# Patient Record
Sex: MALE | Race: WHITE | URBAN - METROPOLITAN AREA
[De-identification: names, ages, dates, MRNs, and addresses within clinical notes are randomized per-mention and may not be internally consistent; named-entity substitution may affect disease eponyms.]

---

## 2017-08-11 DIAGNOSIS — N40.0 BPH (BENIGN PROSTATIC HYPERPLASIA): Primary | ICD-10-CM

## 2017-08-11 RX ORDER — TAMSULOSIN HYDROCHLORIDE 0.4 MG/1
0.8 CAPSULE ORAL DAILY
Qty: 180 CAPSULE | Refills: 0 | Status: SHIPPED | OUTPATIENT
Start: 2017-08-11 | End: 2017-11-27

## 2017-08-11 NOTE — TELEPHONE ENCOUNTER
Signed Prescriptions:                        Disp   Refills    tamsulosin (FLOMAX) 0.4 MG capsule         180 ca*0        Sig: Take 2 capsules (0.8 mg) by mouth daily Patient needs           appointment for further refills.  Authorizing Provider: ANDRY JIMENEZ  Ordering User: RADHA BRICE    Prescription approved per [facility] Refill Protocol.  Radha Brice RN

## 2017-11-27 ENCOUNTER — TELEPHONE (OUTPATIENT)
Dept: FAMILY MEDICINE | Facility: CLINIC | Age: 72
End: 2017-11-27

## 2017-11-27 DIAGNOSIS — N40.0 BPH (BENIGN PROSTATIC HYPERPLASIA): ICD-10-CM

## 2017-11-27 RX ORDER — TAMSULOSIN HYDROCHLORIDE 0.4 MG/1
0.8 CAPSULE ORAL DAILY
Qty: 60 CAPSULE | Refills: 0 | Status: SHIPPED | OUTPATIENT
Start: 2017-11-27 | End: 2017-12-06

## 2017-11-27 NOTE — TELEPHONE ENCOUNTER
Reason for call: med refill   Patient called regarding (reason for call): prescription-tamsulosin 0.4mg-hcl  Additional comments:Judy is out now and is asking for a refill until his appointment to be filled at [facility] at [phone]      Phone number to reach patient: [phone]  Best Time: anytime    Can we leave a detailed message on this number?  YES

## 2017-11-27 NOTE — TELEPHONE ENCOUNTER
Signed Prescriptions:                        Disp   Refills    tamsulosin (FLOMAX) 0.4 MG capsule         60 cap*0        Sig: Take 2 capsules (0.8 mg) by mouth daily Patient needs           appointment for further refills.  Authorizing Provider: ANDRY JIMENEZ  Ordering User: RADHA BRICE

## 2017-12-06 ENCOUNTER — OFFICE VISIT (OUTPATIENT)
Dept: UROLOGY | Facility: OTHER | Age: 72
End: 2017-12-06
Payer: COMMERCIAL

## 2017-12-06 VITALS
BODY MASS INDEX: 32.68 KG/M2 | SYSTOLIC BLOOD PRESSURE: 130 MMHG | DIASTOLIC BLOOD PRESSURE: 68 MMHG | HEIGHT: 72 IN | WEIGHT: 241.25 LBS

## 2017-12-06 DIAGNOSIS — N40.1 BENIGN PROSTATIC HYPERPLASIA WITH LOWER URINARY TRACT SYMPTOMS, SYMPTOM DETAILS UNSPECIFIED: ICD-10-CM

## 2017-12-06 PROCEDURE — 51798 US URINE CAPACITY MEASURE: CPT | Performed by: UROLOGY

## 2017-12-06 PROCEDURE — 99213 OFFICE O/P EST LOW 20 MIN: CPT | Mod: 25 | Performed by: UROLOGY

## 2017-12-06 RX ORDER — SIMVASTATIN 5 MG
2.5 TABLET ORAL
COMMUNITY
Start: 2017-04-24

## 2017-12-06 RX ORDER — TAMSULOSIN HYDROCHLORIDE 0.4 MG/1
0.8 CAPSULE ORAL DAILY
Qty: 60 CAPSULE | Refills: 0 | Status: SHIPPED | OUTPATIENT
Start: 2017-12-06 | End: 2018-02-01

## 2017-12-06 NOTE — PROGRESS NOTES
Chief Complaint   Patient presents with     RECHECK     prostate       Moisés Sinclair is a 72 year old male who presents today for follow up of   Chief Complaint   Patient presents with     RECHECK     prostate    f/u benign prostatic hyperplasia.  He is on flomax.  He still has some urinary symptoms.  His AUA is 18 with QOL of 4.  Patient is interested in urolift.    Current Outpatient Prescriptions   Medication Sig Dispense Refill     ranitidine (ZANTAC) 150 MG tablet Take 150 mg by mouth       simvastatin (ZOCOR) 5 MG tablet Take 2.5 mg by mouth       tamsulosin (FLOMAX) 0.4 MG capsule Take 2 capsules (0.8 mg) by mouth daily Patient needs appointment for further refills. 60 capsule 0     DULoxetine (CYMBALTA) 30 MG capsule Take 30 mg by mouth 2 times daily.       NEW MED 1 tablet 2 times daily. Unknown OTC drug for acid reflux       acetaminophen (TYLENOL) 500 MG tablet Take 1,000 mg by mouth every 6 hours as needed.       tadalafil (CIALIS) 20 MG tablet Take 1 tablet (20 mg) by mouth daily as needed for erectile dysfunction (Patient not taking: Reported on 12/6/2017) 30 tablet 4     No Known Allergies   Past Medical History:   Diagnosis Date     Anxiety      Bipolar disorder (H)      Chronic pain      Closed fracture of navicular (scaphoid) bone of wrist     Wrist fracture left 3 pins     Enthesopathy of hip region 01/02/2008    bilat     Esophageal reflux      Mixed hyperlipidemia      Smoker      Unspecified essential hypertension      Unspecified hereditary and idiopathic peripheral neuropathy [id number]     Past Surgical History:   Procedure Laterality Date     FRACTURE TX, WRIST RT/LT  2000    Left wrist     ROTATOR CUFF REPAIR RT/LT      right shoulder     VASECTOMY       Family History   Problem Relation Age of Onset     CEREBROVASCULAR DISEASE Father      HEART DISEASE Father      TIA triple by pass     CANCER Brother      Psychotic Disorder Brother      HEART DISEASE Sister      heart disease      Psychotic Disorder Sister      bi polar     Social History     Social History     Marital status:      Spouse name: N/A     Number of children: N/A     Years of education: N/A     Social History Main Topics     Smoking status: Current Every Day Smoker     Packs/day: 1.00     Types: Cigarettes     Smokeless tobacco: Never Used     Alcohol use Yes      Comment: rare     Drug use: No     Sexual activity: Not Currently     Other Topics Concern     Not on file     Social History Narrative       REVIEW OF SYSTEMS  =================  C: NEGATIVE for fever, chills, change in weight  I: NEGATIVE for worrisome rashes, moles or lesions  E/M: NEGATIVE for ear, mouth and throat problems  R: NEGATIVE for significant cough or SHORTNESS OF BREATH,   CV: NEGATIVE for chest pain, palpitations or peripheral edema  GI: NEGATIVE for nausea, abdominal pain, heartburn, or change in bowel habits  NEURO: NEGATIVE any motor/sensory changes  PSYCH: NEGATIVE for recent mood disorder    Physical Exam:  /68 (BP Location: Right arm, Patient Position: Chair, Cuff Size: Adult Regular)  Ht 6' (1.829 m)  Wt 241 lb 4 oz (109.4 kg)  BMI 32.72 kg/m2   Patient is pleasant, in no acute distress, good general condition.  Lung: no evidence of respiratory distress    Abdomen: Soft, nondistended, non tender. No masses. No rebound or guarding.   Exam: bladder scan 67 ml.  No cva tenderness  Skin: Warm and dry.  No redness.  Psych: normal mood and affect  Neuro: alert and oriented    Assessment/Plan:   (N40.1) Benign prostatic hyperplasia with lower urinary tract symptoms, symptom details unspecified  Comment:    Plan: MEASURE POST-VOID RESIDUAL URINE/BLADDER         CAPACITY, US NON-IMAGING, tamsulosin (FLOMAX)         0.4 MG capsule        Schedule for urolift next year

## 2017-12-06 NOTE — NURSING NOTE
Chief Complaint   Patient presents with     RECHECK     prostate       Initial /68 (BP Location: Right arm, Patient Position: Chair, Cuff Size: Adult Regular)  Ht 6' (1.829 m)  Wt 241 lb 4 oz (109.4 kg)  BMI 32.72 kg/m2 Estimated body mass index is 32.72 kg/(m^2) as calculated from the following:    Height as of this encounter: 6' (1.829 m).    Weight as of this encounter: 241 lb 4 oz (109.4 kg).  Medication Reconciliation: complete     Anjelica Quintanilla, CMA

## 2017-12-06 NOTE — MR AVS SNAPSHOT
"              After Visit Summary   2017    Moisés Sinclair    MRN: [id number]           Patient Information     Date Of Birth          1945        Visit Information        Provider Department      2017 10:15 AM Shahzad Vilallba MD [facility]        Today's Diagnoses     Benign prostatic hyperplasia with lower urinary tract symptoms, symptom details unspecified           Follow-ups after your visit        Who to contact     If you have questions or need follow up information about today's clinic visit or your schedule please contact [facility] directly at [phone].  Normal or non-critical lab and imaging results will be communicated to you by Polwirehart, letter or phone within 4 business days after the clinic has received the results. If you do not hear from us within 7 days, please contact the clinic through Polwirehart or phone. If you have a critical or abnormal lab result, we will notify you by phone as soon as possible.  Submit refill requests through SofGenie or call your pharmacy and they will forward the refill request to us. Please allow 3 business days for your refill to be completed.          Additional Information About Your Visit        MyChart Information     SofGenie lets you send messages to your doctor, view your test results, renew your prescriptions, schedule appointments and more. To sign up, go to [email or web address] . Click on \"Log in\" on the left side of the screen, which will take you to the Welcome page. Then click on \"Sign up Now\" on the right side of the page.     You will be asked to enter the access code listed below, as well as some personal information. Please follow the directions to create your username and password.     Your access code is: [email or web address]  Expires: 3/6/2018 10:36 AM     Your access code will  in 90 days. If you need help or a new code, please call your [facility] or [phone].        Care EveryWhere ID  "    This is your Care EveryWhere ID. This could be used by other organizations to access your [place] medical records  [id number]        Your Vitals Were     Height BMI (Body Mass Index)                6' (1.829 m) 32.72 kg/m2           Blood Pressure from Last 3 Encounters:   12/06/17 130/68   08/03/16 152/74   06/09/16 140/74    Weight from Last 3 Encounters:   12/06/17 241 lb 4 oz (109.4 kg)   08/03/16 228 lb (103.4 kg)   06/09/16 228 lb (103.4 kg)              We Performed the Following     MEASURE POST-VOID RESIDUAL URINE/BLADDER CAPACITY, US NON-IMAGING          Where to get your medicines      These medications were sent to [facility] - [place], [place] - [address]  [address] [place] [address] [address]     Phone:  [phone]     tamsulosin 0.4 MG capsule          Primary Care Provider Office Phone # Fax #    Bryant Sabillon [phone] [address] [facility] [address]        Equal Access to Services     [facility]: Hadii aad ku hadasho Soomaali, waaxda luqadaha, qaybta kaalmada adeegyada, nigel sheikh . So [facility] [phone].    ATENCIÓN: Si habla español, tiene a liu disposición servicios gratuitos de asistencia lingüística. LlThe [facility] [phone].    We comply with applicable federal civil rights laws and [place] laws. We do not discriminate on the basis of race, color, national origin, age, disability, sex, sexual orientation, or gender identity.            Thank you!     Thank you for choosing [facility]  for your care. Our goal is always to provide you with excellent care. Hearing back from our patients is one way we can continue to improve our services. Please take a few minutes to complete the written survey that you may receive in the mail after your visit with us. Thank you!             Your Updated Medication List - Protect others around you: Learn how to safely use, store and throw away your medicines at  www.disposemymeds.org.          This list is accurate as of: 12/6/17 10:36 AM.  Always use your most recent med list.                   Brand Name Dispense Instructions for use Diagnosis    CYMBALTA 30 MG EC capsule   Generic drug:  DULoxetine      Take 30 mg by mouth 2 times daily.        NEW MED      1 tablet 2 times daily. Unknown OTC drug for acid reflux        ranitidine 150 MG tablet    ZANTAC     Take 150 mg by mouth        simvastatin 5 MG tablet    ZOCOR     Take 2.5 mg by mouth        tadalafil 20 MG tablet    CIALIS    30 tablet    Take 1 tablet (20 mg) by mouth daily as needed for erectile dysfunction    Impotence of organic origin       tamsulosin 0.4 MG capsule    FLOMAX    60 capsule    Take 2 capsules (0.8 mg) by mouth daily Patient needs appointment for further refills.    Benign prostatic hyperplasia with lower urinary tract symptoms, symptom details unspecified       TYLENOL 500 MG tablet   Generic drug:  acetaminophen      Take 1,000 mg by mouth every 6 hours as needed.

## 2017-12-06 NOTE — PROGRESS NOTES
Bladder Scan performed. First time 353 mL and after voiding 67mL maximum residual urine detected after 3 scans. MD informed.     Anjelica Quintanilla, CMA

## 2017-12-07 ENCOUNTER — TELEPHONE (OUTPATIENT)
Dept: UROLOGY | Facility: CLINIC | Age: 72
End: 2017-12-07

## 2017-12-07 NOTE — TELEPHONE ENCOUNTER
Patient needs urolift in January. Left message for patient to call to discuss possible dates. 1/11/2018? Or possibly 1/4/2018 if Hospital can add it on late afternoon?   Lisy España, CMA

## 2017-12-07 NOTE — TELEPHONE ENCOUNTER
Reason for call:  Other   Patient called regarding (reason for call): call back  Additional comments: Patient returned call/please call again.      Phone number to reach patient:  Home number on file [phone] (home)    Best Time:  ASAP    Can we leave a detailed message on this number?  YES

## 2017-12-08 ENCOUNTER — TELEPHONE (OUTPATIENT)
Dept: UROLOGY | Facility: CLINIC | Age: 72
End: 2017-12-08

## 2017-12-08 NOTE — TELEPHONE ENCOUNTER
Surgery scheduled at [facility] on 3/8/2018 at 12:30pm, arrive 11:00am. Surgical packet mailed to patient home address. precert sent to [facility] care. Form faxed to [facility].  Lisy España CMA

## 2017-12-08 NOTE — LETTER
[address] [address]  [phone]          December 8, 2017    Moisés Sinclair                                                                                                                     [address] [facility] [address]            Dear Moisés,        Enclosed is information about your upcomming surgery. Please call our office if you have questions, [phone].    Sincerely,     [facility]

## 2017-12-08 NOTE — TELEPHONE ENCOUNTER
Reason for Call:  Other returning call    Detailed comments: Patient returned a call. He states home number is still the best number to reach him. Thank you.     Phone Number Patient can be reached at: Home number on file [phone] (home)    Best Time: any    Can we leave a detailed message on this number? YES    Call taken on 12/8/2017 at 9:04 AM by Jovanni Ann

## 2017-12-08 NOTE — TELEPHONE ENCOUNTER
Patient prefers [facility] in early March. I will schedule and mail patient the info packet.   Lisy España, CMA

## 2018-02-01 ENCOUNTER — TELEPHONE (OUTPATIENT)
Dept: FAMILY MEDICINE | Facility: CLINIC | Age: 73
End: 2018-02-01

## 2018-02-01 DIAGNOSIS — N40.1 BENIGN PROSTATIC HYPERPLASIA WITH LOWER URINARY TRACT SYMPTOMS, SYMPTOM DETAILS UNSPECIFIED: ICD-10-CM

## 2018-02-01 RX ORDER — TAMSULOSIN HYDROCHLORIDE 0.4 MG/1
0.8 CAPSULE ORAL DAILY
Qty: 180 CAPSULE | Refills: 3 | Status: SHIPPED | OUTPATIENT
Start: 2018-02-01 | End: 2019-02-01

## 2018-02-01 NOTE — TELEPHONE ENCOUNTER
Reason for call: med refill  Patient called regarding (reason for call): prescription-flomax  Additional comments: Patient has been out over a week and pharmacy has faxed twice for it    Phone number to reach patient:  [phone]  Best Time:  anytime    Can we leave a detailed message on this number?  YES

## 2018-02-22 ENCOUNTER — TELEPHONE (OUTPATIENT)
Dept: UROLOGY | Facility: CLINIC | Age: 73
End: 2018-02-22

## 2018-02-22 NOTE — TELEPHONE ENCOUNTER
Reason for Call:  Other call back    Detailed comments: Patient is scheduled for surgery on 03/08/2017 in [place], patient is asking if he is going to need to have a preop done before surgery.Please call and advise Thankyou    Phone Number Patient can be reached at: Home number on file [phone] (home)    Best Time: any    Can we leave a detailed message on this number? YES    Call taken on 2/22/2018 at 11:19 AM by Gayatri Bird

## 2018-02-22 NOTE — TELEPHONE ENCOUNTER
RN called and spoke with patient regarding need for Pre-op. RN provided patient with [facility] number to call and schedule pre-op. Patient thanked RN and we ended call.    CATRACHITA Calvillo

## 2018-02-28 ENCOUNTER — OFFICE VISIT (OUTPATIENT)
Dept: FAMILY MEDICINE | Facility: CLINIC | Age: 73
End: 2018-02-28
Payer: COMMERCIAL

## 2018-02-28 VITALS
SYSTOLIC BLOOD PRESSURE: 120 MMHG | HEART RATE: 80 BPM | TEMPERATURE: 97.8 F | WEIGHT: 243.8 LBS | DIASTOLIC BLOOD PRESSURE: 68 MMHG | OXYGEN SATURATION: 96 % | BODY MASS INDEX: 33.02 KG/M2 | HEIGHT: 72 IN

## 2018-02-28 DIAGNOSIS — N40.1 BENIGN PROSTATIC HYPERPLASIA WITH WEAK URINARY STREAM: ICD-10-CM

## 2018-02-28 DIAGNOSIS — Z01.818 PREOP GENERAL PHYSICAL EXAM: Primary | ICD-10-CM

## 2018-02-28 DIAGNOSIS — R39.12 BENIGN PROSTATIC HYPERPLASIA WITH WEAK URINARY STREAM: ICD-10-CM

## 2018-02-28 LAB
ERYTHROCYTE [DISTWIDTH] IN BLOOD BY AUTOMATED COUNT: 13.4 % (ref 10–15)
HCT VFR BLD AUTO: 41.1 % (ref 40–53)
HGB BLD-MCNC: 13.1 G/DL (ref 13.3–17.7)
MCH RBC QN AUTO: 31.7 PG (ref 26.5–33)
MCHC RBC AUTO-ENTMCNC: 31.9 G/DL (ref 31.5–36.5)
MCV RBC AUTO: 100 FL (ref 78–100)
PLATELET # BLD AUTO: 268 10E9/L (ref 150–450)
RBC # BLD AUTO: 4.13 10E12/L (ref 4.4–5.9)
WBC # BLD AUTO: 14 10E9/L (ref 4–11)

## 2018-02-28 PROCEDURE — 93000 ELECTROCARDIOGRAM COMPLETE: CPT | Performed by: NURSE PRACTITIONER

## 2018-02-28 PROCEDURE — 85027 COMPLETE CBC AUTOMATED: CPT | Performed by: NURSE PRACTITIONER

## 2018-02-28 PROCEDURE — 36415 COLL VENOUS BLD VENIPUNCTURE: CPT | Performed by: NURSE PRACTITIONER

## 2018-02-28 PROCEDURE — 99214 OFFICE O/P EST MOD 30 MIN: CPT | Performed by: NURSE PRACTITIONER

## 2018-02-28 NOTE — MR AVS SNAPSHOT
After Visit Summary   2/28/2018    Moisés Sinclair    MRN: [id number]           Patient Information     Date Of Birth          1945        Visit Information        Provider Department      2/28/2018 10:00 AM Olena Saldivar APRN [facility]        Today's Diagnoses     Preop general physical exam    -  1    Benign prostatic hyperplasia with weak urinary stream          Care Instructions      Before Your Surgery      Call your surgeon if there is any change in your health. This includes signs of a cold or flu (such as a sore throat, runny nose, cough, rash or fever).    Do not smoke, drink alcohol or take over the counter medicine (unless your surgeon or primary care doctor tells you to) for the 24 hours before and after surgery.    If you take prescribed drugs: Follow your doctor s orders about which medicines to take and which to stop until after surgery.    Eating and drinking prior to surgery: follow the instructions from your surgeon    Take a shower or bath the night before surgery. Use the soap your surgeon gave you to gently clean your skin. If you do not have soap from your surgeon, use your regular soap. Do not shave or scrub the surgery site.  Wear clean pajamas and have clean sheets on your bed.           Follow-ups after your visit        Your next 10 appointments already scheduled     Mar 06, 2018  9:15 AM CST   [facility] MEDICARE AAA SCREENING with PHUS1   [facility]trasound ([facility])    [address] [place] [id number] [phone]           Please bring a list of your medicines (including vitamins, minerals and over-the-counter drugs). Also, tell your doctor about any allergies you may have. Wear comfortable clothes and leave your valuables at home.  Adults: No eating or drinking for 8 hours before the exam. You may take medicine with a small sip of water.  Children: - Children 6+ years: No food or drink for 6  "hours before exam. - Children 1-5 years: No food or drink for 4 hours before exam. - Infants, breast-fed: may have breast milk up to 2 hours before exam. - Infants, formula: may have bottle until 4 hours before exam.  Please call the Imaging Department at your exam site with any questions.            Mar 08, 2018   Procedure with Shahzad Villalba MD   [facility] ([facility])    [address] [place] [address] [place] [id number] [phone]           From [address]: Exit at [address] on south side of [place]. Turn right on [address]. Turn left at stoplight on [address]. [facility] will be in view two blocks ahead              Future tests that were ordered for you today     Open Future Orders        Priority Expected Expires Ordered    [place] [employer] Medicare AAA Screening Routine  2/27/2019 2/27/2018            Who to contact     If you have questions or need follow up information about today's clinic visit or your schedule please contact [facility] directly at [phone].  Normal or non-critical lab and imaging results will be communicated to you by MyChart, letter or phone within 4 business days after the clinic has received the results. If you do not hear from us within 7 days, please contact the clinic through Virtustreamt or phone. If you have a critical or abnormal lab result, we will notify you by phone as soon as possible.  Submit refill requests through POWWOW or call your pharmacy and they will forward the refill request to us. Please allow 3 business days for your refill to be completed.          Additional Information About Your Visit        [employer] Information     POWWOW lets you send messages to your doctor, view your test results, renew your prescriptions, schedule appointments and more. To sign up, go to [email or web address] . Click on \"Log in\" on the left side of the screen, which will take you to the Welcome page. Then " "click on \"Sign up Now\" on the right side of the page.     You will be asked to enter the access code listed below, as well as some personal information. Please follow the directions to create your username and password.     Your access code is: [email or web address]  Expires: 3/6/2018 10:36 AM     Your access code will  in 90 days. If you need help or a new code, please call your [place] clinic or [phone].        Care EveryWhere ID     This is your Care EveryWhere ID. This could be used by other organizations to access your [place] medical records  [id number]        Your Vitals Were     Pulse Temperature Height Pulse Oximetry BMI (Body Mass Index)       80 97.8  F (36.6  C) (Temporal) 6' (1.829 m) 96% 33.07 kg/m2        Blood Pressure from Last 3 Encounters:   18 120/68   17 130/68   16 152/74    Weight from Last 3 Encounters:   18 243 lb 12.8 oz (110.6 kg)   17 241 lb 4 oz (109.4 kg)   16 228 lb (103.4 kg)              We Performed the Following     CBC with platelets     EKG 12-lead complete w/read - Clinics        Primary Care Provider Office Phone # Fax #    Bryant Sabillon [phone] [address] [facility] [address]        Equal Access to Services     [facility]: Hadii aad ku hadasho Soomaali, waaxda luqadaha, qaybta kaalmada adeegyada, nigel sheikh . So [facility] [phone].    ATENCIÓN: Si habla español, tiene a liu disposición servicios gratuitos de asistencia lingüística. Llame al [phone].    We comply with applicable federal civil rights laws and [place] laws. We do not discriminate on the basis of race, color, national origin, age, disability, sex, sexual orientation, or gender identity.            Thank you!     Thank you for choosing [facility]  for your care. Our goal is always to provide you with excellent care. Hearing back from our patients is one way we can continue to " improve our services. Please take a few minutes to complete the written survey that you may receive in the mail after your visit with us. Thank you!             Your Updated Medication List - Protect others around you: Learn how to safely use, store and throw away your medicines at www.disposemymeds.org.          This list is accurate as of 2/28/18  1:41 PM.  Always use your most recent med list.                   Brand Name Dispense Instructions for use Diagnosis    CYMBALTA 30 MG EC capsule   Generic drug:  DULoxetine      Take 30 mg by mouth 2 times daily.        ranitidine 150 MG tablet    ZANTAC     Take 150 mg by mouth        simvastatin 5 MG tablet    ZOCOR     Take 2.5 mg by mouth        tamsulosin 0.4 MG capsule    FLOMAX    180 capsule    Take 2 capsules (0.8 mg) by mouth daily Patient needs appointment for further refills.    Benign prostatic hyperplasia with lower urinary tract symptoms, symptom details unspecified       TYLENOL 500 MG tablet   Generic drug:  acetaminophen      Take 1,000 mg by mouth every 6 hours as needed.

## 2018-02-28 NOTE — PROGRESS NOTES
[address] [address]  [phone]  Dept: [phone]    PRE-OP EVALUATION:  Today's date: 2018    Moisés Sinclair (: 1945) presents for pre-operative evaluation assessment as requested by Dr. Villalba.  He requires evaluation and anesthesia risk assessment prior to undergoing surgery/procedure for treatment of urinary hesitancy and incomplete emptying secondary to BPH  Fax number for surgical facility:   Primary Physician: Marco Antonio Bermudez MD/  Misbah LANCASTER  Type of Anesthesia Anticipated: General    Patient has a Health Care Directive or Living Will:  NO    Preop Questions 2018   Who is doing your surgery? Dr. Shahzad Villalba   What are you having done? Prostate Surgery   Date of Surgery/Procedure: 2018   Facility or Hospital where procedure/surgery will be performed: [facility]   1.  Do you have a history of Heart attack, stroke, stent, coronary bypass surgery, or other heart surgery? No   2.  Do you ever have any pain or discomfort in your chest? No   3.  Do you have a history of  Heart Failure? No   4.   Are you troubled by shortness of breath when:  walking on a level surface, or up a slight hill, or at night? No   5.  Do you currently have a cold, bronchitis or other respiratory infection? No   6.  Do you have a cough, shortness of breath, or wheezing? No   7.  Do you sometimes get pains in the calves of your legs when you walk? YES - due to neuropathy   8. Do you or anyone in your family have previous history of blood clots? No   9.  Do you or does anyone in your family have a serious bleeding problem such as prolonged bleeding following surgeries or cuts? No   10. Have you ever had problems with anemia or been told to take iron pills? YES - [facility], has since stopped for a couple days   11. Have you had any abnormal blood loss such as black, tarry or bloody stools? UNKNOWN - ? Due to iron  pills   12. Have you ever had a blood transfusion? No   13. Have you or any of your relatives ever had problems with anesthesia? No   14. Do you have sleep apnea, excessive snoring or daytime drowsiness? YES - snores   15. Do you have any prosthetic heart valves? No   16. Do you have prosthetic joints? No     No personal or family history of anesthesia reactions or malignant hyperthermia  No personal or family history of blood clots or bleeding disorders  No recent use of steroids    HPI:     HPI related to upcoming procedure: Hx BPH.  Problems with urination worsening  See problem list for active medical problems.  Problems all longstanding and stable, except as noted/documented.  See ROS for pertinent symptoms related to these conditions.                Followed by Misbah LANCASTER. Last saw Dr. Bermudez in January                                                                                  .    MEDICAL HISTORY:     Patient Active Problem List    Diagnosis Date Noted     Leukocytosis 07/01/2014     Priority: Medium     Angular cheilitis 07/01/2014     Priority: Medium     Hyperlipidemia with target LDL less than 130 06/06/2014     Priority: Medium     Diagnosis updated by automated process. Provider to review and confirm.       BPH (benign prostatic hyperplasia) 06/06/2014     Priority: Medium     Obesity 06/06/2014     Priority: Medium     Advanced directives, counseling/discussion 08/13/2013     Priority: Medium     Form given   Maria Ines Ladd MA         Lumbar discogenic pain syndrome 08/13/2013     Priority: Medium     Bipolar disorder (H)      Priority: Medium     Anxiety      Priority: Medium     Smoker      Priority: Medium     Hereditary and idiopathic peripheral neuropathy      Priority: Medium     Problem list name updated by automated process. Provider to review       Esophageal reflux      Priority: Medium     Mixed hyperlipidemia      Priority: Medium     DJD (degenerative joint disease) 04/18/2013      Priority: Medium     Idiopathic peripheral neuropathy 04/18/2013     Priority: Medium     Encounter for long-term use of opiate analgesic 04/18/2013     Priority: Medium     Acute reaction to stress 04/18/2013     Priority: Medium     Problem list name updated by automated process. Provider to review        Past Medical History:   Diagnosis Date     Anxiety      Bipolar disorder (H)      Chronic pain      Closed fracture of navicular (scaphoid) bone of wrist     Wrist fracture left 3 pins     Enthesopathy of hip region 01/02/2008    bilat     Esophageal reflux      Mixed hyperlipidemia      Smoker      Unspecified essential hypertension      Unspecified hereditary and idiopathic peripheral neuropathy [id number]     Past Surgical History:   Procedure Laterality Date     FRACTURE TX, WRIST RT/LT  2000    Left wrist     ROTATOR CUFF REPAIR RT/LT      right shoulder     VASECTOMY       Current Outpatient Prescriptions   Medication Sig Dispense Refill     tamsulosin (FLOMAX) 0.4 MG capsule Take 2 capsules (0.8 mg) by mouth daily Patient needs appointment for further refills. 180 capsule 3     ranitidine (ZANTAC) 150 MG tablet Take 150 mg by mouth       simvastatin (ZOCOR) 5 MG tablet Take 2.5 mg by mouth       DULoxetine (CYMBALTA) 30 MG capsule Take 30 mg by mouth 2 times daily.       acetaminophen (TYLENOL) 500 MG tablet Take 1,000 mg by mouth every 6 hours as needed.       OTC products: None, except as noted above    No Known Allergies   Latex Allergy: NO    Social History   Substance Use Topics     Smoking status: Current Every Day Smoker     Packs/day: 1.00     Types: Cigarettes     Smokeless tobacco: Never Used     Alcohol use Yes      Comment: rare     History   Drug Use No       REVIEW OF SYSTEMS:   CONSTITUTIONAL: NEGATIVE for fever, chills, change in weight  INTEGUMENTARY/SKIN: NEGATIVE for worrisome rashes, moles or lesions  EYES: NEGATIVE for vision changes or irritation  ENT/MOUTH: NEGATIVE for ear, mouth and  throat problems  RESP: NEGATIVE for significant cough or SOB  CV: NEGATIVE for chest pain, palpitations or peripheral edema  GI: NEGATIVE for nausea, abdominal pain, heartburn, or change in bowel habits   male :positive for, decreased urinary stream and hesitancy  MUSCULOSKELETAL: NEGATIVE for significant arthralgias or myalgia  NEURO: NEGATIVE for weakness, dizziness or paresthesias  ENDOCRINE: NEGATIVE for temperature intolerance, skin/hair changes  HEME: NEGATIVE for bleeding problems  PSYCHIATRIC: NEGATIVE for changes in mood or affect    EXAM:   /68  Pulse 80  Temp 97.8  F (36.6  C) (Temporal)  Ht 6' (1.829 m)  Wt 243 lb 12.8 oz (110.6 kg)  SpO2 96%  BMI 33.07 kg/m2    GENERAL APPEARANCE: Very pleasant overweight male in no acute distress.  Hydration status appears adequate     EYES: EOMI,  PERRL     HENT: ear canals and TM's normal and nose and mouth without ulcers or lesions     NECK: no adenopathy, no asymmetry, masses, or scars and thyroid normal to palpation     RESP: lungs clear to auscultation - no rales, rhonchi or wheezes     CV: regular rates and rhythm, normal S1 S2, no S3 or S4 and no murmur, click or rub     ABDOMEN:  soft, nontender, no HSM or masses and bowel sounds normal     MS: extremities normal- no gross deformities noted, no evidence of inflammation in joints, FROM in all extremities.     SKIN: no suspicious lesions or rashes     NEURO: Normal strength and tone, sensory exam grossly normal, mentation intact and speech normal     PSYCH: mentation appears normal. and affect normal/bright     LYMPHATICS: No cervical adenopathy    DIAGNOSTICS:     EKG: appears normal, NSR, normal axis, normal intervals, no acute ST/T changes c/w ischemia, no LVH by voltage criteria  Labs Resulted Today:   Results for orders placed or performed in visit on 02/28/18   CBC with platelets   Result Value Ref Range    WBC 14.0 (H) 4.0 - 11.0 10e9/L    RBC Count 4.13 (L) 4.4 - 5.9 10e12/L    Hemoglobin  13.1 (L) 13.3 - 17.7 g/dL    Hematocrit 41.1 40.0 - 53.0 %     78 - 100 fl    MCH 31.7 26.5 - 33.0 pg    MCHC 31.9 31.5 - 36.5 g/dL    RDW 13.4 10.0 - 15.0 %    Platelet Count 268 150 - 450 10e9/L       Recent Labs   Lab Test  07/01/14   1308  06/06/14   1132   HGB  13.1*  13.0*   PLT  297  344   NA   --   137   POTASSIUM   --   4.6   CR   --   0.87        IMPRESSION:   Reason for surgery/procedure: Cystoscopy/ urolift  Diagnosis/reason for consult: No medical contraindication noted to proceeding as planned    The proposed surgical procedure is considered INTERMEDIATE risk.    REVISED CARDIAC RISK INDEX  The patient has the following serious cardiovascular risks for perioperative complications such as (MI, PE, VFib and 3  AV Block):  No serious cardiac risks  INTERPRETATION: 0 risks: Class I (very low risk - 0.4% complication rate)    The patient has the following additional risks for perioperative complications:  No identified additional risks      ICD-10-CM    1. Preop general physical exam Z01.818 EKG 12-lead complete w/read - Clinics     CBC with platelets   2. Benign prostatic hyperplasia with weak urinary stream N40.1     R39.12        RECOMMENDATIONS:     --Consult hospital rounder / IM to assist post-op medical management    --Patient is to take all scheduled medications on the day of surgery     APPROVAL GIVEN to proceed with proposed procedure, without further diagnostic evaluation     The patient recalls he takes 2 other medications daily for anxiety and panic, since he has a history of PTSD.  He cannot recall exactly what the medications are, but he thinks one is Abilify.  He is uncertain as to the dose.    Signed Electronically by: MONI Carver CNP    Copy of this evaluation report is provided to requesting physician.    [facility] Preop Guidelines

## 2018-03-06 ENCOUNTER — HOSPITAL ENCOUNTER (OUTPATIENT)
Dept: ULTRASOUND IMAGING | Facility: CLINIC | Age: 73
Discharge: HOME | End: 2018-03-06
Attending: FAMILY MEDICINE | Admitting: FAMILY MEDICINE
Payer: MEDICARE

## 2018-03-06 DIAGNOSIS — Z87.891 PERSONAL HISTORY OF NICOTINE DEPENDENCE: ICD-10-CM

## 2018-03-06 PROCEDURE — 76706 US ABDL AORTA SCREEN AAA: CPT | Mod: TC

## 2018-03-07 NOTE — H&P (VIEW-ONLY)
[address] [address]  [phone]  Dept: [phone]    PRE-OP EVALUATION:  Today's date: 2018    Moisés Sinclair (: 1945) presents for pre-operative evaluation assessment as requested by Dr. Villalba.  He requires evaluation and anesthesia risk assessment prior to undergoing surgery/procedure for treatment of urinary hesitancy and incomplete emptying secondary to BPH  Fax number for surgical facility:   Primary Physician: Marco Antonio Bermudez MD/  Misbah LANCASTER  Type of Anesthesia Anticipated: General    Patient has a Health Care Directive or Living Will:  NO    Preop Questions 2018   Who is doing your surgery? Dr. Shahzad Villalba   What are you having done? Prostate Surgery   Date of Surgery/Procedure: 2018   Facility or Hospital where procedure/surgery will be performed: [facility]   1.  Do you have a history of Heart attack, stroke, stent, coronary bypass surgery, or other heart surgery? No   2.  Do you ever have any pain or discomfort in your chest? No   3.  Do you have a history of  Heart Failure? No   4.   Are you troubled by shortness of breath when:  walking on a level surface, or up a slight hill, or at night? No   5.  Do you currently have a cold, bronchitis or other respiratory infection? No   6.  Do you have a cough, shortness of breath, or wheezing? No   7.  Do you sometimes get pains in the calves of your legs when you walk? YES - due to neuropathy   8. Do you or anyone in your family have previous history of blood clots? No   9.  Do you or does anyone in your family have a serious bleeding problem such as prolonged bleeding following surgeries or cuts? No   10. Have you ever had problems with anemia or been told to take iron pills? YES - [facility], has since stopped for a couple days   11. Have you had any abnormal blood loss such as black, tarry or bloody stools? UNKNOWN - ? Due to iron  pills   12. Have you ever had a blood transfusion? No   13. Have you or any of your relatives ever had problems with anesthesia? No   14. Do you have sleep apnea, excessive snoring or daytime drowsiness? YES - snores   15. Do you have any prosthetic heart valves? No   16. Do you have prosthetic joints? No     No personal or family history of anesthesia reactions or malignant hyperthermia  No personal or family history of blood clots or bleeding disorders  No recent use of steroids    HPI:     HPI related to upcoming procedure: Hx BPH.  Problems with urination worsening  See problem list for active medical problems.  Problems all longstanding and stable, except as noted/documented.  See ROS for pertinent symptoms related to these conditions.                Followed by Misbah LANCASTER. Last saw Dr. Bermudez in January                                                                                  .    MEDICAL HISTORY:     Patient Active Problem List    Diagnosis Date Noted     Leukocytosis 07/01/2014     Priority: Medium     Angular cheilitis 07/01/2014     Priority: Medium     Hyperlipidemia with target LDL less than 130 06/06/2014     Priority: Medium     Diagnosis updated by automated process. Provider to review and confirm.       BPH (benign prostatic hyperplasia) 06/06/2014     Priority: Medium     Obesity 06/06/2014     Priority: Medium     Advanced directives, counseling/discussion 08/13/2013     Priority: Medium     Form given   Maria Ines Ladd MA         Lumbar discogenic pain syndrome 08/13/2013     Priority: Medium     Bipolar disorder (H)      Priority: Medium     Anxiety      Priority: Medium     Smoker      Priority: Medium     Hereditary and idiopathic peripheral neuropathy      Priority: Medium     Problem list name updated by automated process. Provider to review       Esophageal reflux      Priority: Medium     Mixed hyperlipidemia      Priority: Medium     DJD (degenerative joint disease) 04/18/2013      Priority: Medium     Idiopathic peripheral neuropathy 04/18/2013     Priority: Medium     Encounter for long-term use of opiate analgesic 04/18/2013     Priority: Medium     Acute reaction to stress 04/18/2013     Priority: Medium     Problem list name updated by automated process. Provider to review        Past Medical History:   Diagnosis Date     Anxiety      Bipolar disorder (H)      Chronic pain      Closed fracture of navicular (scaphoid) bone of wrist     Wrist fracture left 3 pins     Enthesopathy of hip region 01/02/2008    bilat     Esophageal reflux      Mixed hyperlipidemia      Smoker      Unspecified essential hypertension      Unspecified hereditary and idiopathic peripheral neuropathy [id number]     Past Surgical History:   Procedure Laterality Date     FRACTURE TX, WRIST RT/LT  2000    Left wrist     ROTATOR CUFF REPAIR RT/LT      right shoulder     VASECTOMY       Current Outpatient Prescriptions   Medication Sig Dispense Refill     tamsulosin (FLOMAX) 0.4 MG capsule Take 2 capsules (0.8 mg) by mouth daily Patient needs appointment for further refills. 180 capsule 3     ranitidine (ZANTAC) 150 MG tablet Take 150 mg by mouth       simvastatin (ZOCOR) 5 MG tablet Take 2.5 mg by mouth       DULoxetine (CYMBALTA) 30 MG capsule Take 30 mg by mouth 2 times daily.       acetaminophen (TYLENOL) 500 MG tablet Take 1,000 mg by mouth every 6 hours as needed.       OTC products: None, except as noted above    No Known Allergies   Latex Allergy: NO    Social History   Substance Use Topics     Smoking status: Current Every Day Smoker     Packs/day: 1.00     Types: Cigarettes     Smokeless tobacco: Never Used     Alcohol use Yes      Comment: rare     History   Drug Use No       REVIEW OF SYSTEMS:   CONSTITUTIONAL: NEGATIVE for fever, chills, change in weight  INTEGUMENTARY/SKIN: NEGATIVE for worrisome rashes, moles or lesions  EYES: NEGATIVE for vision changes or irritation  ENT/MOUTH: NEGATIVE for ear, mouth and  throat problems  RESP: NEGATIVE for significant cough or SOB  CV: NEGATIVE for chest pain, palpitations or peripheral edema  GI: NEGATIVE for nausea, abdominal pain, heartburn, or change in bowel habits   male :positive for, decreased urinary stream and hesitancy  MUSCULOSKELETAL: NEGATIVE for significant arthralgias or myalgia  NEURO: NEGATIVE for weakness, dizziness or paresthesias  ENDOCRINE: NEGATIVE for temperature intolerance, skin/hair changes  HEME: NEGATIVE for bleeding problems  PSYCHIATRIC: NEGATIVE for changes in mood or affect    EXAM:   /68  Pulse 80  Temp 97.8  F (36.6  C) (Temporal)  Ht 6' (1.829 m)  Wt 243 lb 12.8 oz (110.6 kg)  SpO2 96%  BMI 33.07 kg/m2    GENERAL APPEARANCE: Very pleasant overweight male in no acute distress.  Hydration status appears adequate     EYES: EOMI,  PERRL     HENT: ear canals and TM's normal and nose and mouth without ulcers or lesions     NECK: no adenopathy, no asymmetry, masses, or scars and thyroid normal to palpation     RESP: lungs clear to auscultation - no rales, rhonchi or wheezes     CV: regular rates and rhythm, normal S1 S2, no S3 or S4 and no murmur, click or rub     ABDOMEN:  soft, nontender, no HSM or masses and bowel sounds normal     MS: extremities normal- no gross deformities noted, no evidence of inflammation in joints, FROM in all extremities.     SKIN: no suspicious lesions or rashes     NEURO: Normal strength and tone, sensory exam grossly normal, mentation intact and speech normal     PSYCH: mentation appears normal. and affect normal/bright     LYMPHATICS: No cervical adenopathy    DIAGNOSTICS:     EKG: appears normal, NSR, normal axis, normal intervals, no acute ST/T changes c/w ischemia, no LVH by voltage criteria  Labs Resulted Today:   Results for orders placed or performed in visit on 02/28/18   CBC with platelets   Result Value Ref Range    WBC 14.0 (H) 4.0 - 11.0 10e9/L    RBC Count 4.13 (L) 4.4 - 5.9 10e12/L    Hemoglobin  13.1 (L) 13.3 - 17.7 g/dL    Hematocrit 41.1 40.0 - 53.0 %     78 - 100 fl    MCH 31.7 26.5 - 33.0 pg    MCHC 31.9 31.5 - 36.5 g/dL    RDW 13.4 10.0 - 15.0 %    Platelet Count 268 150 - 450 10e9/L       Recent Labs   Lab Test  07/01/14   1308  06/06/14   1132   HGB  13.1*  13.0*   PLT  297  344   NA   --   137   POTASSIUM   --   4.6   CR   --   0.87        IMPRESSION:   Reason for surgery/procedure: Cystoscopy/ urolift  Diagnosis/reason for consult: No medical contraindication noted to proceeding as planned    The proposed surgical procedure is considered INTERMEDIATE risk.    REVISED CARDIAC RISK INDEX  The patient has the following serious cardiovascular risks for perioperative complications such as (MI, PE, VFib and 3  AV Block):  No serious cardiac risks  INTERPRETATION: 0 risks: Class I (very low risk - 0.4% complication rate)    The patient has the following additional risks for perioperative complications:  No identified additional risks      ICD-10-CM    1. Preop general physical exam Z01.818 EKG 12-lead complete w/read - Clinics     CBC with platelets   2. Benign prostatic hyperplasia with weak urinary stream N40.1     R39.12        RECOMMENDATIONS:     --Consult hospital rounder / IM to assist post-op medical management    --Patient is to take all scheduled medications on the day of surgery     APPROVAL GIVEN to proceed with proposed procedure, without further diagnostic evaluation     The patient recalls he takes 2 other medications daily for anxiety and panic, since he has a history of PTSD.  He cannot recall exactly what the medications are, but he thinks one is Abilify.  He is uncertain as to the dose.    Signed Electronically by: MONI Carver CNP    Copy of this evaluation report is provided to requesting physician.    [facility] Preop Guidelines

## 2018-03-08 ENCOUNTER — ANESTHESIA EVENT (OUTPATIENT)
Dept: SURGERY | Facility: CLINIC | Age: 73
End: 2018-03-08
Payer: MEDICARE

## 2018-03-08 ENCOUNTER — ANESTHESIA (OUTPATIENT)
Dept: SURGERY | Facility: CLINIC | Age: 73
End: 2018-03-08
Payer: MEDICARE

## 2018-03-08 ENCOUNTER — SURGERY (OUTPATIENT)
Age: 73
End: 2018-03-08

## 2018-03-08 ENCOUNTER — HOSPITAL ENCOUNTER (OUTPATIENT)
Facility: CLINIC | Age: 73
Discharge: HOME | End: 2018-03-08
Attending: UROLOGY | Admitting: UROLOGY
Payer: MEDICARE

## 2018-03-08 VITALS
SYSTOLIC BLOOD PRESSURE: 116 MMHG | OXYGEN SATURATION: 98 % | RESPIRATION RATE: 16 BRPM | DIASTOLIC BLOOD PRESSURE: 72 MMHG | HEART RATE: 81 BPM | TEMPERATURE: 97.9 F

## 2018-03-08 DIAGNOSIS — N40.1 BENIGN PROSTATIC HYPERPLASIA WITH WEAK URINARY STREAM: Primary | ICD-10-CM

## 2018-03-08 DIAGNOSIS — R39.12 BENIGN PROSTATIC HYPERPLASIA WITH WEAK URINARY STREAM: Primary | ICD-10-CM

## 2018-03-08 PROCEDURE — 27810325 ZZHC OR IMPLANT OTHER OPNP: Performed by: UROLOGY

## 2018-03-08 PROCEDURE — 25000125 ZZHC RX 250: Performed by: NURSE ANESTHETIST, CERTIFIED REGISTERED

## 2018-03-08 PROCEDURE — 71000027 ZZH RECOVERY PHASE 2 EACH 15 MINS: Performed by: UROLOGY

## 2018-03-08 PROCEDURE — 36000050 ZZH SURGERY LEVEL 2 1ST 30 MIN: Performed by: UROLOGY

## 2018-03-08 PROCEDURE — 37000009 ZZH ANESTHESIA TECHNICAL FEE, EACH ADDTL 15 MIN: Performed by: UROLOGY

## 2018-03-08 PROCEDURE — 52442 CYSTO INS TRNSPRSTC IMPLT EA: CPT | Performed by: UROLOGY

## 2018-03-08 PROCEDURE — L8699 PROSTHETIC IMPLANT NOS: HCPCS | Performed by: UROLOGY

## 2018-03-08 PROCEDURE — 40000306 ZZH STATISTIC PRE PROC ASSESS II: Performed by: UROLOGY

## 2018-03-08 PROCEDURE — C9740 CYSTO IMPL 4 OR MORE: HCPCS | Performed by: UROLOGY

## 2018-03-08 PROCEDURE — 25000128 H RX IP 250 OP 636: Performed by: NURSE ANESTHETIST, CERTIFIED REGISTERED

## 2018-03-08 PROCEDURE — 25000128 H RX IP 250 OP 636: Performed by: UROLOGY

## 2018-03-08 PROCEDURE — 52441 CYSTO INSJ TRNSPRSTC 1 IMPLT: CPT | Performed by: UROLOGY

## 2018-03-08 PROCEDURE — 37000008 ZZH ANESTHESIA TECHNICAL FEE, 1ST 30 MIN: Performed by: UROLOGY

## 2018-03-08 RX ORDER — CEFAZOLIN SODIUM 1 G/3ML
1 INJECTION, POWDER, FOR SOLUTION INTRAMUSCULAR; INTRAVENOUS SEE ADMIN INSTRUCTIONS
Status: DISCONTINUED | OUTPATIENT
Start: 2018-03-08 | End: 2018-03-08 | Stop reason: HOSPADM

## 2018-03-08 RX ORDER — LIDOCAINE HYDROCHLORIDE 20 MG/ML
INJECTION, SOLUTION INFILTRATION; PERINEURAL PRN
Status: DISCONTINUED | OUTPATIENT
Start: 2018-03-08 | End: 2018-03-08

## 2018-03-08 RX ORDER — NALOXONE HYDROCHLORIDE 0.4 MG/ML
.1-.4 INJECTION, SOLUTION INTRAMUSCULAR; INTRAVENOUS; SUBCUTANEOUS
Status: CANCELLED | OUTPATIENT
Start: 2018-03-08 | End: 2018-03-09

## 2018-03-08 RX ORDER — ACETAMINOPHEN AND CODEINE PHOSPHATE 300; 30 MG/1; MG/1
1-2 TABLET ORAL EVERY 4 HOURS PRN
Qty: 20 TABLET | Refills: 0 | Status: SHIPPED | OUTPATIENT
Start: 2018-03-08 | End: 2018-04-04

## 2018-03-08 RX ORDER — ONDANSETRON 2 MG/ML
4 INJECTION INTRAMUSCULAR; INTRAVENOUS EVERY 30 MIN PRN
Status: CANCELLED | OUTPATIENT
Start: 2018-03-08

## 2018-03-08 RX ORDER — DEXAMETHASONE SODIUM PHOSPHATE 10 MG/ML
INJECTION, SOLUTION INTRAMUSCULAR; INTRAVENOUS PRN
Status: DISCONTINUED | OUTPATIENT
Start: 2018-03-08 | End: 2018-03-08

## 2018-03-08 RX ORDER — ONDANSETRON 2 MG/ML
INJECTION INTRAMUSCULAR; INTRAVENOUS PRN
Status: DISCONTINUED | OUTPATIENT
Start: 2018-03-08 | End: 2018-03-08

## 2018-03-08 RX ORDER — PROPOFOL 10 MG/ML
INJECTION, EMULSION INTRAVENOUS PRN
Status: DISCONTINUED | OUTPATIENT
Start: 2018-03-08 | End: 2018-03-08

## 2018-03-08 RX ORDER — CEFAZOLIN SODIUM 2 G/100ML
2 INJECTION, SOLUTION INTRAVENOUS
Status: COMPLETED | OUTPATIENT
Start: 2018-03-08 | End: 2018-03-08

## 2018-03-08 RX ORDER — ARIPIPRAZOLE 5 MG/1
2.5 TABLET ORAL DAILY
COMMUNITY

## 2018-03-08 RX ORDER — FENTANYL CITRATE 50 UG/ML
25-50 INJECTION, SOLUTION INTRAMUSCULAR; INTRAVENOUS
Status: CANCELLED | OUTPATIENT
Start: 2018-03-08

## 2018-03-08 RX ORDER — SODIUM CHLORIDE, SODIUM LACTATE, POTASSIUM CHLORIDE, CALCIUM CHLORIDE 600; 310; 30; 20 MG/100ML; MG/100ML; MG/100ML; MG/100ML
INJECTION, SOLUTION INTRAVENOUS CONTINUOUS
Status: CANCELLED | OUTPATIENT
Start: 2018-03-08

## 2018-03-08 RX ORDER — PROPOFOL 10 MG/ML
INJECTION, EMULSION INTRAVENOUS CONTINUOUS PRN
Status: DISCONTINUED | OUTPATIENT
Start: 2018-03-08 | End: 2018-03-08

## 2018-03-08 RX ORDER — CIPROFLOXACIN 500 MG/1
500 TABLET, FILM COATED ORAL 2 TIMES DAILY
Qty: 6 TABLET | Refills: 0 | Status: SHIPPED | OUTPATIENT
Start: 2018-03-08 | End: 2018-03-11

## 2018-03-08 RX ORDER — SODIUM CHLORIDE, SODIUM LACTATE, POTASSIUM CHLORIDE, CALCIUM CHLORIDE 600; 310; 30; 20 MG/100ML; MG/100ML; MG/100ML; MG/100ML
INJECTION, SOLUTION INTRAVENOUS CONTINUOUS
Status: DISCONTINUED | OUTPATIENT
Start: 2018-03-08 | End: 2018-03-08 | Stop reason: HOSPADM

## 2018-03-08 RX ORDER — LIDOCAINE 40 MG/G
CREAM TOPICAL
Status: DISCONTINUED | OUTPATIENT
Start: 2018-03-08 | End: 2018-03-08 | Stop reason: HOSPADM

## 2018-03-08 RX ORDER — ONDANSETRON 4 MG/1
4 TABLET, ORALLY DISINTEGRATING ORAL EVERY 30 MIN PRN
Status: CANCELLED | OUTPATIENT
Start: 2018-03-08

## 2018-03-08 RX ORDER — FENTANYL CITRATE 50 UG/ML
INJECTION, SOLUTION INTRAMUSCULAR; INTRAVENOUS PRN
Status: DISCONTINUED | OUTPATIENT
Start: 2018-03-08 | End: 2018-03-08

## 2018-03-08 RX ADMIN — PROPOFOL 150 MCG/KG/MIN: 10 INJECTION, EMULSION INTRAVENOUS at 12:17

## 2018-03-08 RX ADMIN — FENTANYL CITRATE 50 MCG: 50 INJECTION, SOLUTION INTRAMUSCULAR; INTRAVENOUS at 12:16

## 2018-03-08 RX ADMIN — PROPOFOL 100 MG: 10 INJECTION, EMULSION INTRAVENOUS at 12:17

## 2018-03-08 RX ADMIN — CEFAZOLIN SODIUM 2 G: 2 INJECTION, SOLUTION INTRAVENOUS at 12:17

## 2018-03-08 RX ADMIN — DEXAMETHASONE SODIUM PHOSPHATE 10 MG: 10 INJECTION, SOLUTION INTRAMUSCULAR; INTRAVENOUS at 12:16

## 2018-03-08 RX ADMIN — LIDOCAINE HYDROCHLORIDE 1 ML: 10 INJECTION, SOLUTION EPIDURAL; INFILTRATION; INTRACAUDAL; PERINEURAL at 11:51

## 2018-03-08 RX ADMIN — ONDANSETRON 4 MG: 2 INJECTION INTRAMUSCULAR; INTRAVENOUS at 12:23

## 2018-03-08 RX ADMIN — LIDOCAINE HYDROCHLORIDE 100 MG: 20 INJECTION, SOLUTION INFILTRATION; PERINEURAL at 12:17

## 2018-03-08 RX ADMIN — MIDAZOLAM 1 MG: 1 INJECTION INTRAMUSCULAR; INTRAVENOUS at 12:09

## 2018-03-08 RX ADMIN — MIDAZOLAM 1 MG: 1 INJECTION INTRAMUSCULAR; INTRAVENOUS at 12:04

## 2018-03-08 RX ADMIN — SODIUM CHLORIDE, POTASSIUM CHLORIDE, SODIUM LACTATE AND CALCIUM CHLORIDE: 600; 310; 30; 20 INJECTION, SOLUTION INTRAVENOUS at 11:51

## 2018-03-08 ASSESSMENT — LIFESTYLE VARIABLES: TOBACCO_USE: 1

## 2018-03-08 NOTE — ANESTHESIA POSTPROCEDURE EVALUATION
Patient: Moisés Sinclair    Procedure(s):  urolift and cystoscopy - Wound Class: II-Clean Contaminated    Diagnosis:BPH  Diagnosis Additional Information: No value filed.    Anesthesia Type:  General, LMA    Note:  Anesthesia Post Evaluation    Patient location during evaluation: Phase 2 and Bedside  Patient participation: Able to fully participate in evaluation  Level of consciousness: awake and alert  Pain management: satisfactory to patient  Airway patency: patent  Cardiovascular status: stable  Respiratory status: spontaneous ventilation and room air  Hydration status: stable  PONV: none     Anesthetic complications: None    Comments: Appear to tolerate MAC with IV sedation well without anesthesia related problems / complications noted.  Pain level satisfactory per patient. No N  /  V.  No complaints per patient.  Will follow as needed.          Last vitals:  Vitals:    03/08/18 1119   BP: 140/79   Pulse: 81   Resp: 14   Temp: 97.9  F (36.6  C)   SpO2: 97%         Electronically Signed By: MONI Graf CRNA  March 8, 2018  1:18 PM

## 2018-03-08 NOTE — PROCEDURES
SURGEON: Shahzad Villalba MD     PREOPERATIVE DIAGNOSIS: Benign prostatic hyperplasia with obstruction.     POSTOPERATIVE DIAGNOSIS: Benign prostatic hyperplasia with obstruction.     PROCEDURE PERFORMED: Prostatic urethral lift.     DESCRIPTION OF PROCEDURE: The patient was brought to the operating room and placed in dorsal lithotomy position. He was prepped and draped in the usual surgical fashion.  A cystoscope was then placed into the bladder. I used 4 UroLifts during this procedure given the prostatic length.  The scope was withdrawn into the prostate about 1.5 cm away from the bladder neck and the UroLift device was placed in the usual fashion without any problem. The patient tolerated the procedure well. No complications identified during procedure. There was minimal bleeding during the operation.           Shahzad Villalba MD

## 2018-03-08 NOTE — ANESTHESIA PREPROCEDURE EVALUATION
Anesthesia Evaluation     . Pt has had prior anesthetic. Type: General and MAC    No history of anesthetic complications          ROS/MED HX    ENT/Pulmonary:     (+)tobacco use, Current use 1 ppd packs/day  , . .    Neurologic:  - neg neurologic ROS     Cardiovascular:     (+) hypertension----. : . . . :. . Previous cardiac testing date:results:date: results:ECG reviewed date: results:NSR date: results:          METS/Exercise Tolerance:     Hematologic:  - neg hematologic  ROS       Musculoskeletal:  - neg musculoskeletal ROS       GI/Hepatic:     (+) GERD       Renal/Genitourinary:     (+) BPH,       Endo:     (+) Obesity, .      Psychiatric:     (+) psychiatric history anxiety and bipolar      Infectious Disease:  - neg infectious disease ROS       Malignancy:      - no malignancy   Other:                     Physical Exam  Normal systems: cardiovascular, pulmonary and dental    Airway   Mallampati: II  TM distance: >3 FB  Neck ROM: full    Dental     Cardiovascular   Rhythm and rate: regular and normal  (-) no murmur    Pulmonary    breath sounds clear to auscultation                    Anesthesia Plan      History & Physical Review  History and physical reviewed and following examination; no interval change.    ASA Status:  2 .    NPO Status:  > 6 hours    Plan for General and LMA with Propofol induction. Maintenance will be Balanced.    PONV prophylaxis:  Ondansetron (or other 5HT-3) and Dexamethasone or Solumedrol       Postoperative Care  Postoperative pain management:  IV analgesics and Oral pain medications.      Consents  Anesthetic plan, risks, benefits and alternatives discussed with:  Patient.  Use of blood products discussed: No .   .                          .

## 2018-03-08 NOTE — IP AVS SNAPSHOT
[facility] Phase II    [address]     [place] [address]    Phone:  [phone]                                       After Visit Summary   3/8/2018    Moisés Sinclair    MRN: [id number]           After Visit Summary Signature Page     I have received my discharge instructions, and my questions have been answered. I have discussed any challenges I see with this plan with the nurse or doctor.    ..........................................................................................................................................  Patient/Patient Representative Signature      ..........................................................................................................................................  Patient Representative Print Name and Relationship to Patient    ..................................................               ................................................  Date                                            Time    ..........................................................................................................................................  Reviewed by Signature/Title    ...................................................              ..............................................  Date                                                            Time

## 2018-03-08 NOTE — IP AVS SNAPSHOT
MRN:[id number]                      After Visit Summary   3/8/2018    Moisés Sinclair    MRN: [id number]           Thank you!     Thank you for choosing [facility] for your care. Our goal is always to provide you with excellent care. Hearing back from our patients is one way we can continue to improve our services. Please take a few minutes to complete the written survey that you may receive in the mail after you visit with us. Thank you!        Patient Information     Date Of Birth          1945        Designated Caregiver       Most Recent Value    Caregiver    Will someone help with your care after discharge? yes    Name of designated caregiver wife Maribeth      About your hospital stay     You were admitted on:  March 8, 2018 You last received care in the:  [facility] Phase [facility]    You were discharged on:  March 8, 2018       Who to Call     For medical emergencies, please call 911.  For non-urgent questions about your medical care, please call your primary care provider or clinic, [phone]  For questions related to your surgery, please call your surgery clinic        Attending Provider     Provider Specialty    Shahzad Villalba MD Urology       Primary Care Provider Office Phone # Fax #    Bryant Sabillon [phone]      After Care Instructions     Diet Instructions       Resume pre procedure diet            Discharge Instructions       Patient to arrange for follow up appointment in 4-6 weeks            Encourage fluids       Encourage fluids at home to keep urine clear to light pink            No Alcohol       for 24 hours post procedure            No driving or operating machinery        until the day after procedure                  Further instructions from your care team       [facility]  Same-Day Surgery  Adult Discharge Orders & Instructions    For 24 hours after surgery    1. Get plenty of rest.  A responsible adult must  "stay with you for at least 24 hours after you leave the hospital.   2. Do not drive or use heavy equipment.  If you have weakness or tingling, don't drive or use heavy equipment until this feeling goes away.  3. Do not drink alcohol.  4. Avoid strenuous or risky activities.  Ask for help when climbing stairs.   5. You may feel lightheaded.  IF so, sit for a few minutes before standing.  Have someone help you get up.   6. If you have nausea (feel sick to your stomach): Drink only clear liquids such as apple juice, ginger ale, broth or 7-Up.  Rest may also help.  Be sure to drink enough fluids.  Move to a regular diet as you feel able.  7. You may have a slight fever. Call the doctor if your fever is over 100 F (37.7 C) (taken under the tongue) or lasts longer than 24 hours.  8. You may have a dry mouth, a sore throat, muscle aches or trouble sleeping.  These should go away after 24 hours.  9. Do not make important or legal decisions.   Call your doctor for any of the followin.  Signs of infection (fever, growing tenderness at the surgery site, a large amount of drainage or bleeding, severe pain, foul-smelling drainage, redness, swelling).    2. It has been over 8 to 10 hours since surgery and you are still not able to urinate (pass water).    3.  Headache for over 24 hours.            Pending Results     No orders found from 3/6/2018 to 3/9/2018.            Admission Information     Date & Time Provider Department Dept. Phone    3/8/2018 Shahzad Villalba MD [facility] Phase [phone]      Your Vitals Were     Blood Pressure Pulse Temperature Respirations Pulse Oximetry       140/79 81 97.9  F (36.6  C) (Oral) 14 97%       MyChart Information     [employer] lets you send messages to your doctor, view your test results, renew your prescriptions, schedule appointments and more. To sign up, go to [email or web address] . Click on \"Log in\" on the left side of the screen, which will take you to the " "Welcome page. Then click on \"Sign up Now\" on the right side of the page.     You will be asked to enter the access code listed below, as well as some personal information. Please follow the directions to create your username and password.     Your access code is: [email or web address]  Expires: 2018  1:30 PM     Your access code will  in 90 days. If you need help or a new code, please call your [facility] clinic or [phone].        Care EveryWhere ID     This is your Care EveryWhere ID. This could be used by other organizations to access your [facility] medical records  [id number]        Equal Access to Services     [facility] : Fiordaliza Burnham, saadia gay, topher reynolds, nigel sheikh . So [facility] [phone].    ATENCIÓN: Si habla español, tiene a liu disposición servicios gratuitos de asistencia lingüística. Llame al [phone].    We comply with applicable federal civil rights laws and [place] laws. We do not discriminate on the basis of race, color, national origin, age, disability, sex, sexual orientation, or gender identity.               Review of your medicines      UNREVIEWED medicines. Ask your doctor about these medicines        Dose / Directions    ABILIFY 5 MG tablet   Generic drug:  ARIPiprazole        Dose:  2.5 mg   Take 2.5 mg by mouth daily   Refills:  0       CYMBALTA 30 MG EC capsule   Generic drug:  DULoxetine        Dose:  30 mg   Take 30 mg by mouth 2 times daily.   Refills:  0       ranitidine 150 MG tablet   Commonly known as:  ZANTAC        Dose:  150 mg   Take 150 mg by mouth   Refills:  0       simvastatin 5 MG tablet   Commonly known as:  ZOCOR        Dose:  2.5 mg   Take 2.5 mg by mouth   Refills:  0       tamsulosin 0.4 MG capsule   Commonly known as:  FLOMAX   Used for:  Benign prostatic hyperplasia with lower urinary tract symptoms, symptom details unspecified        Dose:  0.8 mg   Take 2 capsules (0.8 mg) by mouth " daily Patient needs appointment for further refills.   Quantity:  180 capsule   Refills:  3       TYLENOL 500 MG tablet   Generic drug:  acetaminophen        Dose:  1000 mg   Take 1,000 mg by mouth every 6 hours as needed.   Refills:  0         START taking        Dose / Directions    acetaminophen-codeine 300-30 MG per tablet   Commonly known as:  TYLENOL w/CODEINE No. 3   Used for:  Benign prostatic hyperplasia with weak urinary stream        Dose:  1-2 tablet   Take 1-2 tablets by mouth every 4 hours as needed for mild pain   Quantity:  20 tablet   Refills:  0       ciprofloxacin 500 MG tablet   Commonly known as:  CIPRO   Used for:  Benign prostatic hyperplasia with weak urinary stream        Dose:  500 mg   Take 1 tablet (500 mg) by mouth 2 times daily for 3 days Start to take morning of the procedure   Quantity:  6 tablet   Refills:  0       phenazopyridine 97.5 MG tablet   Commonly known as:  AZO   Used for:  Benign prostatic hyperplasia with weak urinary stream        Dose:  195 mg   Take 2 tablets (195 mg) by mouth 3 times daily   Quantity:  12 tablet   Refills:  1            Where to get your medicines      These medications were sent to [id number] - [facility] - [address]  [address] [place] [address]     Phone:  [phone]     ciprofloxacin 500 MG tablet    phenazopyridine 97.5 MG tablet         Some of these will need a paper prescription and others can be bought over the counter. Ask your nurse if you have questions.     Bring a paper prescription for each of these medications     acetaminophen-codeine 300-30 MG per tablet                Protect others around you: Learn how to safely use, store and throw away your medicines at www.disposemymeds.org.        ANTIBIOTIC INSTRUCTION     You've Been Prescribed an Antibiotic - Now What?  Your healthcare team thinks that you or your loved one might have an infection. Some infections can be treated with antibiotics, which are powerful, life-saving  drugs. Like all medications, antibiotics have side effects and should only be used when necessary. There are some important things you should know about your antibiotic treatment.      Your healthcare team may run tests before you start taking an antibiotic.    Your team may take samples (e.g., from your blood, urine or other areas) to run tests to look for bacteria. These test can be important to determine if you need an antibiotic at all and, if you do, which antibiotic will work best.      Within a few days, your healthcare team might change or even stop your antibiotic.    Your team may start you on an antibiotic while they are working to find out what is making you sick.    Your team might change your antibiotic because test results show that a different antibiotic would be better to treat your infection.    In some cases, once your team has more information, they learn that you do not need an antibiotic at all. They may find out that you don't have an infection, or that the antibiotic you're taking won't work against your infection. For example, an infection caused by a virus can't be treated with antibiotics. Staying on an antibiotic when you don't need it is more likely to be harmful than helpful.      You may experience side effects from your antibiotic.    Like all medications, antibiotics have side effects. Some of these can be serious.    Let you healthcare team know if you have any known allergies when you are admitted to the hospital.    One significant side effect of nearly all antibiotics is the risk of severe and sometimes deadly diarrhea caused by Clostridium difficile (C. Difficile). This occurs when a person takes antibiotics because some good germs are destroyed. Antibiotic use allows C. diificile to take over, putting patients at high risk for this serious infection.    As a patient or caregiver, it is important to understand your or your loved one's antibiotic treatment. It is especially  important for caregivers to speak up when patients can't speak for themselves. Here are some important questions to ask your healthcare team.    What infection is this antibiotic treating and how do you know I have that infection?    What side effects might occur from this antibiotic?    How long will I need to take this antibiotic?    Is it safe to take this antibiotic with other medications or supplements (e.g., vitamins) that I am taking?     Are there any special directions I need to know about taking this antibiotic? For example, should I take it with food?    How will I be monitored to know whether my infection is responding to the antibiotic?    What tests may help to make sure the right antibiotic is prescribed for me?      Information provided by:  www.cdc.gov/getsmart  U.S. Department of Health and Human Services  Centers for disease Control and Prevention  National Center for Emerging and Zoonotic Infectious Diseases  Division of Healthcare Quality Promotion        Information about OPIOIDS     PRESCRIPTION OPIOIDS: WHAT YOU NEED TO KNOW    Prescription opioids can be used to help relieve moderate to severe pain and are often prescribed following a surgery or injury, or for certain health conditions. These medications can be an important part of treatment but also come with serious risks. It is important to work with your health care provider to make sure you are getting the safest, most effective care.    WHAT ARE THE RISKS AND SIDE EFFECTS OF OPIOID USE?  Prescription opioids carry serious risks of addiction and overdose, especially with prolonged use. An opioid overdose, often marked by slowed breathing can cause sudden death. The use of prescription opioids can have a number of side effects as well, even when taken as directed:      Tolerance - meaning you might need to take more of a medication for the same pain relief    Physical dependence - meaning you have symptoms of withdrawal when a medication  is stopped    Increased sensitivity to pain    Constipation    Nausea, vomiting, and dry mouth    Sleepiness and dizziness    Confusion    Depression    Low levels of testosterone that can result in lower sex drive, energy, and strength    Itching and sweating    RISKS ARE GREATER WITH:    History of drug misuse, substance use disorder, or overdose    Mental health conditions (such as depression or anxiety)    Sleep apnea    Older age (65 years or older)    Pregnancy    Avoid alcohol while taking prescription opioids.   Also, unless specifically advised by your health care provider, medications to avoid include:    Benzodiazepines (such as Xanax or Valium)    Muscle relaxants (such as Soma or Flexeril)    Hypnotics (such as Ambien or Lunesta)    Other prescription opioids    KNOW YOUR OPTIONS:  Talk to your health care provider about ways to manage your pain that do not involve prescription opioids. Some of these options may actually work better and have fewer risks and side effects:    Pain relievers such as acetaminophen, ibuprofen, and naproxen    Some medications that are also used for depression or seizures    Physical therapy and exercise    Cognitive behavioral therapy, a psychological, goal-directed approach, in which patients learn how to modify physical, behavioral, and emotional triggers of pain and stress    IF YOU ARE PRESCRIBED OPIOIDS FOR PAIN:    Never take opioids in greater amounts or more often than prescribed    Follow up with your primary health care provider and work together to create a plan on how to manage your pain.    Talk about ways to help manage your pain that do not involve prescription opioids    Talk about all concerns and side effects    Help prevent misuse and abuse    Never sell or share prescription opioids    Never use another person's prescription opioids    Store prescription opioids in a secure place and out of reach of others (this may include visitors, children, friends,  and family)    Visit www.cdc.gov/drugoverdose to learn about risks of opioid abuse and overdose    If you believe you may be struggling with addiction, tell your health care provider and ask for guidance or call [facility]'s National Helpline at 9-671-537-HELP    LEARN MORE / www.cdc.gov/drugoverdose/prescribing/guideline.html    Safely dispose of unused prescription opioids: Find your local drug take-back programs and more information about the importance of safe disposal at www.doseofreality.[place].gov             Medication List: This is a list of all your medications and when to take them. Check marks below indicate your daily home schedule. Keep this list as a reference.      Medications           Morning Afternoon Evening Bedtime As Needed    ABILIFY 5 MG tablet   Take 2.5 mg by mouth daily   Generic drug:  ARIPiprazole                                acetaminophen-codeine 300-30 MG per tablet   Commonly known as:  TYLENOL w/CODEINE No. 3   Take 1-2 tablets by mouth every 4 hours as needed for mild pain                                ciprofloxacin 500 MG tablet   Commonly known as:  CIPRO   Take 1 tablet (500 mg) by mouth 2 times daily for 3 days Start to take morning of the procedure                                CYMBALTA 30 MG EC capsule   Take 30 mg by mouth 2 times daily.   Generic drug:  DULoxetine                                phenazopyridine 97.5 MG tablet   Commonly known as:  AZO   Take 2 tablets (195 mg) by mouth 3 times daily                                ranitidine 150 MG tablet   Commonly known as:  ZANTAC   Take 150 mg by mouth                                simvastatin 5 MG tablet   Commonly known as:  ZOCOR   Take 2.5 mg by mouth                                tamsulosin 0.4 MG capsule   Commonly known as:  FLOMAX   Take 2 capsules (0.8 mg) by mouth daily Patient needs appointment for further refills.                                TYLENOL 500 MG tablet   Take 1,000 mg by mouth every 6 hours as  needed.   Generic drug:  acetaminophen

## 2018-03-08 NOTE — DISCHARGE INSTRUCTIONS
[facility]  Same-Day Surgery  Adult Discharge Orders & Instructions    For 24 hours after surgery    1. Get plenty of rest.  A responsible adult must stay with you for at least 24 hours after you leave the hospital.   2. Do not drive or use heavy equipment.  If you have weakness or tingling, don't drive or use heavy equipment until this feeling goes away.  3. Do not drink alcohol.  4. Avoid strenuous or risky activities.  Ask for help when climbing stairs.   5. You may feel lightheaded.  IF so, sit for a few minutes before standing.  Have someone help you get up.   6. If you have nausea (feel sick to your stomach): Drink only clear liquids such as apple juice, ginger ale, broth or 7-Up.  Rest may also help.  Be sure to drink enough fluids.  Move to a regular diet as you feel able.  7. You may have a slight fever. Call the doctor if your fever is over 100 F (37.7 C) (taken under the tongue) or lasts longer than 24 hours.  8. You may have a dry mouth, a sore throat, muscle aches or trouble sleeping.  These should go away after 24 hours.  9. Do not make important or legal decisions.   Call your doctor for any of the followin.  Signs of infection (fever, growing tenderness at the surgery site, a large amount of drainage or bleeding, severe pain, foul-smelling drainage, redness, swelling).    2. It has been over 8 to 10 hours since surgery and you are still not able to urinate (pass water).    3.  Headache for over 24 hours.

## 2018-03-08 NOTE — BRIEF OP NOTE
[facility]  Brief Operative Note    Pre-operative diagnosis: bph   Post-operative diagnosis same   Procedure: Procedure(s):  CYSTOSCOPY   Surgeon: Shahzad Villalba MD   Assistants(s): None   Anesthesia: mac    Estimated blood loss: minimal                   Specimens: None   Implants: urolifts       Complications: None   Condition on discharge: Stable   Findings: bph  See dictated operative report for full details

## 2018-03-08 NOTE — ANESTHESIA CARE TRANSFER NOTE
Patient: Moisés Sinclair    Procedure(s):  urolift and cystoscopy - Wound Class: II-Clean Contaminated    Diagnosis: BPH  Diagnosis Additional Information: No value filed.    Anesthesia Type:   General, LMA     Note:  Airway :Room Air  Patient transferred to:Phase II  Handoff Report: Identifed the Patient, Identified the Reponsible Provider, Reviewed the pertinent medical history, Discussed the surgical course, Reviewed Intra-OP anesthesia mangement and issues during anesthesia, Set expectations for post-procedure period and Allowed opportunity for questions and acknowledgement of understanding      Vitals: (Last set prior to Anesthesia Care Transfer)    CRNA VITALS  3/8/2018 1209 - 3/8/2018 1245      3/8/2018             Pulse: 66    SpO2: 95 %                Electronically Signed By: MONI Graf CRNA  March 8, 2018  12:45 PM

## 2018-03-12 ENCOUNTER — TELEPHONE (OUTPATIENT)
Dept: UROLOGY | Facility: CLINIC | Age: 73
End: 2018-03-12

## 2018-03-12 NOTE — TELEPHONE ENCOUNTER
RN called and spoke to patient regarding message. Patient reports that he had an Urolift done on 3/8 and reports he is still in pain with blood also in urine. RN explained to patient that this is very common and that he should continue to increase fluid intake and continue with prescribed medication. Patient states that he is almost out of Tylenol number 3. Patient was prescribed 20 pills post procedure. RN explained to patient that if he was needing more narcotic that he would have to be seen by PCP or at an urgent care. No S/S of infection or complications post surgery. Patient agrees with plan.    CATRACHITA Calvillo

## 2018-03-12 NOTE — TELEPHONE ENCOUNTER
Reason for Call:  Other call back    Detailed comments: Per VM on surgery voice mail-Pt had a surgery done 3-8-18 and would like to speak to Dr Villalba or his nurse. No more information left on VM. Please call pt.    Phone Number Patient can be reached at: Home number on file [phone] (home)    Best Time: any     Can we leave a detailed message on this number? YES    Call taken on 3/12/2018 at 11:19 AM by Miriam Gamble

## 2018-03-19 ENCOUNTER — TELEPHONE (OUTPATIENT)
Dept: UROLOGY | Facility: CLINIC | Age: 73
End: 2018-03-19

## 2018-03-19 NOTE — TELEPHONE ENCOUNTER
Received multiple fax request for refill on acetaminophen with codeine.   patient is post-op urolift from 3/8.   Left message for patient to return call to assess post-op pain.   Cecilia Adler RN

## 2018-03-20 NOTE — TELEPHONE ENCOUNTER
Called and spoke to patient.   Patient states is having frequency and burning in the urethra.   Patient states is taking tylenol throughout the day and keeping hydrated.   Patient is requesting a refill on Tylenol #3 with Codeine.   Per Dr. Villalba symptoms with resolve with time.   Patient agrees with plan.   Cecilia Adler RN

## 2018-04-04 ENCOUNTER — OFFICE VISIT (OUTPATIENT)
Dept: UROLOGY | Facility: OTHER | Age: 73
End: 2018-04-04
Payer: COMMERCIAL

## 2018-04-04 VITALS
DIASTOLIC BLOOD PRESSURE: 73 MMHG | RESPIRATION RATE: 20 BRPM | OXYGEN SATURATION: 95 % | HEART RATE: 102 BPM | SYSTOLIC BLOOD PRESSURE: 152 MMHG

## 2018-04-04 DIAGNOSIS — R03.0 ELEVATED BP WITHOUT DIAGNOSIS OF HYPERTENSION: ICD-10-CM

## 2018-04-04 DIAGNOSIS — N40.1 BENIGN PROSTATIC HYPERPLASIA WITH LOWER URINARY TRACT SYMPTOMS, SYMPTOM DETAILS UNSPECIFIED: Primary | ICD-10-CM

## 2018-04-04 PROCEDURE — 51798 US URINE CAPACITY MEASURE: CPT | Performed by: UROLOGY

## 2018-04-04 PROCEDURE — 99213 OFFICE O/P EST LOW 20 MIN: CPT | Mod: 25 | Performed by: UROLOGY

## 2018-04-04 NOTE — PROGRESS NOTES
Bladder Scan performed. 26ml maximum residual urine detected after 3 scans. MD informed.    Brandie Cole, CMA

## 2018-04-04 NOTE — MR AVS SNAPSHOT
"              After Visit Summary   2018    Moisés Sinclair    MRN: [id number]           Patient Information     Date Of Birth          1945        Visit Information        Provider Department      2018 10:30 AM Shahzad Villalba MD [facility]        Today's Diagnoses     Benign prostatic hyperplasia with lower urinary tract symptoms, symptom details unspecified    -  1    Elevated BP without diagnosis of hypertension           Follow-ups after your visit        Who to contact     If you have questions or need follow up information about today's clinic visit or your schedule please contact [facility] directly at [phone].  Normal or non-critical lab and imaging results will be communicated to you by MyChart, letter or phone within 4 business days after the clinic has received the results. If you do not hear from us within 7 days, please contact the clinic through [employer] or phone. If you have a critical or abnormal lab result, we will notify you by phone as soon as possible.  Submit refill requests through [employer] [facility] or call your pharmacy and they will forward the refill request to us. Please allow 3 business days for your refill to be completed.          Additional Information About Your Visit        MyChart Information     [facility] lets you send messages to your doctor, view your test results, renew your prescriptions, schedule appointments and more. To sign up, go to [email or web address] . Click on \"Log in\" on the left side of the screen, which will take you to the Welcome page. Then click on \"Sign up Now\" on the right side of the page.     You will be asked to enter the access code listed below, as well as some personal information. Please follow the directions to create your username and password.     Your access code is: [email or web address]  Expires: 2018  2:30 PM     Your access code will  in 90 days. If you need help or a new code, please call your " [facility] or [phone].        Care EveryWhere ID     This is your Care EveryWhere ID. This could be used by other organizations to access your [facility] medical records  [id number]        Your Vitals Were     Pulse Respirations Pulse Oximetry             102 20 95%          Blood Pressure from Last 3 Encounters:   04/04/18 152/73   03/08/18 116/72   02/28/18 120/68    Weight from Last 3 Encounters:   02/28/18 243 lb 12.8 oz (110.6 kg)   12/06/17 241 lb 4 oz (109.4 kg)   08/03/16 228 lb (103.4 kg)              We Performed the Following     MEASURE POST-VOID RESIDUAL URINE/BLADDER CAPACITY, US NON-IMAGING        Primary Care Provider Office Phone # Fax #    Bryant Sabillon [phone] [address] [facility] [address]        Equal Access to Services     [facility]: Hadii aad ku hadasho Soomaali, waaxda luqadaha, qaybta kaalmada adeegyada, waxay idiin hayaan brenda sheikh . So [facility] [place] [facility] [phone].    ATENCIÓN: Si habla español, tiene a liu disposición servicios gratuitos de asistencia lingüística. Llame al [phone].    We comply with applicable federal civil rights laws and [place] laws. We do not discriminate on the basis of race, color, national origin, age, disability, sex, sexual orientation, or gender identity.            Thank you!     Thank you for choosing [facility]  for your care. Our goal is always to provide you with excellent care. Hearing back from our patients is one way we can continue to improve our services. Please take a few minutes to complete the written survey that you may receive in the mail after your visit with us. Thank you!             Your Updated Medication List - Protect others around you: Learn how to safely use, store and throw away your medicines at www.disposemymeds.org.          This list is accurate as of 4/4/18 10:58 AM.  Always use your most recent med list.                   Brand Name Dispense  Instructions for use Diagnosis    ABILIFY 5 MG tablet   Generic drug:  ARIPiprazole      Take 2.5 mg by mouth daily        CYMBALTA 30 MG EC capsule   Generic drug:  DULoxetine      Take 30 mg by mouth 2 times daily.        phenazopyridine 97.5 MG tablet    AZO    12 tablet    Take 2 tablets (195 mg) by mouth 3 times daily    Benign prostatic hyperplasia with weak urinary stream       ranitidine 150 MG tablet    ZANTAC     Take 150 mg by mouth        simvastatin 5 MG tablet    ZOCOR     Take 2.5 mg by mouth        tamsulosin 0.4 MG capsule    FLOMAX    180 capsule    Take 2 capsules (0.8 mg) by mouth daily Patient needs appointment for further refills.    Benign prostatic hyperplasia with lower urinary tract symptoms, symptom details unspecified       TYLENOL 500 MG tablet   Generic drug:  acetaminophen      Take 1,000 mg by mouth every 6 hours as needed.

## 2018-04-04 NOTE — PROGRESS NOTES
Chief Complaint   Patient presents with     RECHECK     BPH. Patient reports that urinary frequency has improved.        Moisés Sinclair is a 72 year old male who presents today for follow up of   Chief Complaint   Patient presents with     RECHECK     BPH. Patient reports that urinary frequency has improved.     f/u post urolift.  He is doing better since surgery.  His flow is better.  His has less LUTS.  His AUA score is 12 with QOL 1-2.    Current Outpatient Prescriptions   Medication Sig Dispense Refill     ARIPiprazole (ABILIFY) 5 MG tablet Take 2.5 mg by mouth daily       phenazopyridine (AZO) 97.5 MG tablet Take 2 tablets (195 mg) by mouth 3 times daily 12 tablet 1     tamsulosin (FLOMAX) 0.4 MG capsule Take 2 capsules (0.8 mg) by mouth daily Patient needs appointment for further refills. 180 capsule 3     ranitidine (ZANTAC) 150 MG tablet Take 150 mg by mouth       simvastatin (ZOCOR) 5 MG tablet Take 2.5 mg by mouth       DULoxetine (CYMBALTA) 30 MG capsule Take 30 mg by mouth 2 times daily.       acetaminophen (TYLENOL) 500 MG tablet Take 1,000 mg by mouth every 6 hours as needed.       No Known Allergies   Past Medical History:   Diagnosis Date     Anxiety      Bipolar disorder (H)      Chronic pain      Closed fracture of navicular (scaphoid) bone of wrist     Wrist fracture left 3 pins     Enthesopathy of hip region 01/02/2008    bilat     Esophageal reflux      Mixed hyperlipidemia      Smoker      Unspecified essential hypertension      Unspecified hereditary and idiopathic peripheral neuropathy [id number]     Past Surgical History:   Procedure Laterality Date     CYSTOSCOPY N/A 3/8/2018    Procedure: CYSTOSCOPY;  urolift and cystoscopy;  Surgeon: Shahzad Villalba MD;  Location: [facility]     FRACTURE TX, WRIST RT/LT  2000    Left wrist     ROTATOR CUFF REPAIR RT/LT      right shoulder     VASECTOMY       Family History   Problem Relation Age of Onset     CEREBROVASCULAR DISEASE Father      HEART  DISEASE Father      TIA triple by pass     CANCER Brother      Psychotic Disorder Brother      HEART DISEASE Sister      heart disease     Psychotic Disorder Sister      bi polar     Social History     Social History     Marital status:      Spouse name: N/A     Number of children: N/A     Years of education: N/A     Social History Main Topics     Smoking status: Current Every Day Smoker     Packs/day: 1.00     Types: Cigarettes     Smokeless tobacco: Never Used     Alcohol use Yes      Comment: rare     Drug use: No     Sexual activity: Not Currently     Other Topics Concern     None     Social History Narrative       REVIEW OF SYSTEMS  =================  C: NEGATIVE for fever, chills, change in weight  I: NEGATIVE for worrisome rashes, moles or lesions  E/M: NEGATIVE for ear, mouth and throat problems  R: NEGATIVE for significant cough or SHORTNESS OF BREATH,   CV: NEGATIVE for chest pain, palpitations or peripheral edema  GI: NEGATIVE for nausea, abdominal pain, heartburn, or change in bowel habits  NEURO: NEGATIVE any motor/sensory changes  PSYCH: NEGATIVE for recent mood disorder    Physical Exam:  /73 (BP Location: Left arm, Patient Position: Chair, Cuff Size: Adult Large)  Pulse 102  Resp 20  SpO2 95%   Patient is pleasant, in no acute distress, good general condition.  Lung: no evidence of respiratory distress    Abdomen: Soft, nondistended, non tender. No masses. No rebound or guarding.   Exam: normal male.  Minimal PVR  Skin: Warm and dry.  No redness.  Psych: normal mood and affect  Neuro: alert and oriented    Assessment/Plan:   (N40.1) Benign prostatic hyperplasia with lower urinary tract symptoms, symptom details unspecified  (primary encounter diagnosis)  Comment: doing well post urolift  Plan:  D/c flomax           F/u as needed    Elevated bp: Patient to follow up with Primary Care provider regarding elevated blood pressure.

## 2018-04-09 ENCOUNTER — TELEPHONE (OUTPATIENT)
Dept: UROLOGY | Facility: OTHER | Age: 73
End: 2018-04-09

## 2018-04-09 NOTE — TELEPHONE ENCOUNTER
patients e-mail is [email or web address]  Patient will need letter emailed to himself for [employer] regarding the treatment he was given and stating its service connected. Call with any questions.

## 2018-04-09 NOTE — LETTER
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     Due to the patient's service, it is possible this did contribute to the diagnosis of impotence.     Please feel free to contact me with questions or concerns.   [phone]        Sincerely,             Dr. Shahzad Villalba

## 2019-01-31 DIAGNOSIS — N40.1 BENIGN PROSTATIC HYPERPLASIA WITH LOWER URINARY TRACT SYMPTOMS, SYMPTOM DETAILS UNSPECIFIED: ICD-10-CM

## 2019-01-31 NOTE — TELEPHONE ENCOUNTER
Pending Prescriptions:                       Disp   Refills    tamsulosin (FLOMAX) 0.4 MG capsule        180 ca*3            Sig: Take 2 capsules (0.8 mg) by mouth daily Patient           needs appointment for further refills.    Last office visit note from Dr. Villalba on 4/4/18 states that patient should discontinue Flomax and follow up as needed.     RN left message for patient to return call to [facility] @ [phone] by 4 pm, or to call [phone] if calling later this evening or tomorrow. Need to know if this request was initiated by the patient, if he ever discontinued the med, and if so, when he restarted it.     Rosibel Schrader RN

## 2019-02-01 RX ORDER — TAMSULOSIN HYDROCHLORIDE 0.4 MG/1
0.8 CAPSULE ORAL DAILY
Qty: 180 CAPSULE | Refills: 0 | Status: SHIPPED | OUTPATIENT
Start: 2019-02-01 | End: 2019-02-14

## 2019-02-01 NOTE — TELEPHONE ENCOUNTER
Called and spoke to patient.   Patient is still taking medication.   Per Dr. Orly piedra for patient to refill 90 days follow up in clinic.   Caller agrees.   Cecilia Adler RN

## 2019-02-14 RX ORDER — TAMSULOSIN HYDROCHLORIDE 0.4 MG/1
0.8 CAPSULE ORAL DAILY
Qty: 180 CAPSULE | Refills: 0 | Status: SHIPPED | OUTPATIENT
Start: 2019-02-14 | End: 2019-06-03

## 2019-02-14 NOTE — TELEPHONE ENCOUNTER
Received additional request. Pharmacy did not receive prior refill. RN resent to [facility]'s pharmacy.    Signed Prescriptions:                        Disp   Refills    tamsulosin (FLOMAX) 0.4 MG capsule         180 ca*0        Sig: Take 2 capsules (0.8 mg) by mouth daily Patient needs           appointment for further refills.  Authorizing Provider: ANDRY JIMENEZ  Ordering User: GIA VELASQUEZ RN

## 2019-05-30 ENCOUNTER — TELEPHONE (OUTPATIENT)
Dept: UROLOGY | Facility: CLINIC | Age: 74
End: 2019-05-30

## 2019-05-30 NOTE — TELEPHONE ENCOUNTER
Reason for call:  Patient reporting a symptom    Symptom or request: possible UTI    Duration (how long have symptoms been present): 3 weeks    Have you been treated for this before? No    Additional comments: pt states he has strong smelling urine.  [facility] would not be able to see that provider anytime soon.  He would like to know from dr baptiste what he should do.  Please advise.  thansk    Phone Number patient can be reached at:  Cell number on file:    Telephone Information:   Mobile [phone]       Best Time:  any    Can we leave a detailed message on this number:  YES    Call taken on 5/30/2019 at 10:27 AM by Blanca Humphreys

## 2019-05-30 NOTE — TELEPHONE ENCOUNTER
Patient reports his urine has a foul odor. Denies discoloration or cloudy urine, fever, or chills. He would like to speak to Dr. Villalba about this. Has an apt scheduled for 6/26/19. Offered him a sooner apt on 6/3/19 which he accepted. He has no other questions.    Jose Mcgovern RN....5/30/2019 1:04 PM

## 2019-06-03 ENCOUNTER — OFFICE VISIT (OUTPATIENT)
Dept: UROLOGY | Facility: CLINIC | Age: 74
End: 2019-06-03
Payer: COMMERCIAL

## 2019-06-03 VITALS
WEIGHT: 235 LBS | SYSTOLIC BLOOD PRESSURE: 137 MMHG | DIASTOLIC BLOOD PRESSURE: 84 MMHG | OXYGEN SATURATION: 97 % | HEART RATE: 79 BPM | BODY MASS INDEX: 31.87 KG/M2

## 2019-06-03 DIAGNOSIS — N40.1 BENIGN PROSTATIC HYPERPLASIA WITH LOWER URINARY TRACT SYMPTOMS, SYMPTOM DETAILS UNSPECIFIED: ICD-10-CM

## 2019-06-03 DIAGNOSIS — R82.90 FOUL SMELLING URINE: Primary | ICD-10-CM

## 2019-06-03 LAB
ALBUMIN UR-MCNC: NEGATIVE MG/DL
APPEARANCE UR: CLEAR
BILIRUB UR QL STRIP: NEGATIVE
COLOR UR AUTO: YELLOW
GLUCOSE UR STRIP-MCNC: NEGATIVE MG/DL
HGB UR QL STRIP: NEGATIVE
KETONES UR STRIP-MCNC: NEGATIVE MG/DL
LEUKOCYTE ESTERASE UR QL STRIP: NEGATIVE
NITRATE UR QL: NEGATIVE
PH UR STRIP: 7.5 PH (ref 5–7)
SOURCE: ABNORMAL
SP GR UR STRIP: 1.01 (ref 1–1.03)
UROBILINOGEN UR STRIP-ACNC: 0.2 EU/DL (ref 0.2–1)

## 2019-06-03 PROCEDURE — 51798 US URINE CAPACITY MEASURE: CPT | Performed by: UROLOGY

## 2019-06-03 PROCEDURE — 81003 URINALYSIS AUTO W/O SCOPE: CPT | Performed by: UROLOGY

## 2019-06-03 PROCEDURE — 99213 OFFICE O/P EST LOW 20 MIN: CPT | Mod: 25 | Performed by: UROLOGY

## 2019-06-03 RX ORDER — HYDROXYZINE HYDROCHLORIDE 25 MG/1
25-50 TABLET, FILM COATED ORAL
COMMUNITY
Start: 2017-09-08

## 2019-06-03 RX ORDER — TAMSULOSIN HYDROCHLORIDE 0.4 MG/1
0.4 CAPSULE ORAL DAILY
Qty: 90 CAPSULE | Refills: 3 | Status: SHIPPED | OUTPATIENT
Start: 2019-06-03 | End: 2019-09-11

## 2019-06-03 NOTE — PROGRESS NOTES
Bladder Scan performed. 191 maximum residual urine detected after 3 scans. MD informed.    Jose Mcgovern RN....6/3/2019 10:31 AM

## 2019-06-03 NOTE — PROGRESS NOTES
Chief Complaint   Patient presents with     RECHECK     foul-smelling urine for the past 3 weeks       Moisés Sinclair is a 73 year old male who presents today for follow up of   Chief Complaint   Patient presents with     RECHECK     foul-smelling urine for the past 3 weeks   Patient is a 73-year-old with history of BPH status post UroLift about a year ago.  He complains of foul-smelling urine for last 3 weeks.  He has no dysuria, frequency, urgency, cloudy urine.  He has no fever, nausea, vomiting.  He has no hematuria.  He has noticed some difficulty with initiating his urinary stream lately also.    Current Outpatient Medications   Medication Sig Dispense Refill     acetaminophen (TYLENOL) 500 MG tablet Take 1,000 mg by mouth every 6 hours as needed.       ARIPiprazole (ABILIFY) 5 MG tablet Take 2.5 mg by mouth daily       cholecalciferol ( ULTRA STRENGTH) 2000 units CAPS Take 2,000 Units by mouth       DULoxetine (CYMBALTA) 30 MG capsule Take 30 mg by mouth 2 times daily.       hydrOXYzine (ATARAX) 25 MG tablet Take 25-50 mg by mouth       ranitidine (ZANTAC) 150 MG tablet Take 150 mg by mouth       simvastatin (ZOCOR) 5 MG tablet Take 2.5 mg by mouth       tamsulosin (FLOMAX) 0.4 MG capsule Take 1 capsule (0.4 mg) by mouth daily Patient needs appointment for further refills. 90 capsule 3     phenazopyridine (AZO) 97.5 MG tablet Take 2 tablets (195 mg) by mouth 3 times daily (Patient not taking: Reported on 6/3/2019) 12 tablet 1     No Known Allergies   Past Medical History:   Diagnosis Date     Anxiety      Bipolar disorder (H)      Chronic pain      Closed fracture of navicular (scaphoid) bone of wrist     Wrist fracture left 3 pins     Enthesopathy of hip region 01/02/2008    bilat     Esophageal reflux      Mixed hyperlipidemia      Smoker      Unspecified essential hypertension      Unspecified hereditary and idiopathic peripheral neuropathy [id number]     Past Surgical History:   Procedure Laterality  Date     CYSTOSCOPY N/A 3/8/2018    Procedure: CYSTOSCOPY;  urolift and cystoscopy;  Surgeon: Shahzad Villalba MD;  Location: [facility]     FRACTURE TX, WRIST RT/LT  2000    Left wrist     ROTATOR CUFF REPAIR RT/LT      right shoulder     VASECTOMY       Family History   Problem Relation Age of Onset     Cerebrovascular Disease Father      Heart Disease Father         TIA triple by pass     Cancer Brother      Psychotic Disorder Brother      Heart Disease Sister         heart disease     Psychotic Disorder Sister         bi polar     Social History     Socioeconomic History     Marital status:      Spouse name: None     Number of children: None     Years of education: None     Highest education level: None   Occupational History     None   Social Needs     Financial resource strain: None     Food insecurity:     Worry: None     Inability: None     Transportation needs:     Medical: None     Non-medical: None   Tobacco Use     Smoking status: Current Every Day Smoker     Packs/day: 1.00     Types: Cigarettes     Smokeless tobacco: Never Used   Substance and Sexual Activity     Alcohol use: Yes     Comment: rare     Drug use: No     Sexual activity: Not Currently   Lifestyle     Physical activity:     Days per week: None     Minutes per session: None     Stress: None   Relationships     Social connections:     Talks on phone: None     Gets together: None     Attends [employer] service: None     Active member of club or organization: None     Attends meetings of clubs or organizations: None     Relationship status: None     Intimate partner violence:     Fear of current or ex partner: None     Emotionally abused: None     Physically abused: None     Forced sexual activity: None   Other Topics Concern     Parent/sibling w/ CABG, MI or angioplasty before 65F 55M? Not Asked   Social History Narrative     None       REVIEW OF SYSTEMS  =================  C: NEGATIVE for fever, chills, change in weight  I: NEGATIVE for  worrisome rashes, moles or lesions  E/M: NEGATIVE for ear, mouth and throat problems  R: NEGATIVE for significant cough or SHORTNESS OF BREATH,   CV: NEGATIVE for chest pain, palpitations or peripheral edema  GI: NEGATIVE for nausea, abdominal pain, heartburn, or change in bowel habits  NEURO: NEGATIVE any motor/sensory changes  PSYCH: NEGATIVE for recent mood disorder    Physical Exam:  /84 (BP Location: Right arm, Patient Position: Sitting, Cuff Size: Adult Regular)   Pulse 79   Wt 106.6 kg (235 lb)   SpO2 97%   BMI 31.87 kg/m     Patient is pleasant, in no acute distress, good general condition.  Lung: no evidence of respiratory distress    Abdomen: Soft, nondistended, non tender. No masses. No rebound or guarding.   Exam: No CVA tenderness.  Bladder scan with residual urine of 191 mL  Skin: Warm and dry.  No redness.  Psych: normal mood and affect  Neuro: alert and oriented  Musculaskeletal: moving all extremities    Assessment/Plan:   (R82.90) Foul smelling urine  (primary encounter diagnosis)  Comment: UA negative  Plan: Reassurance.  Increase fluid intake.    (N40.1) Benign prostatic hyperplasia with lower urinary tract symptoms, symptom details unspecified  Comment: Bladder scan 191 mL status post UroLift  Plan: tamsulosin (FLOMAX) 0.4 MG capsule        See in 3 months for reexam

## 2019-09-11 ENCOUNTER — OFFICE VISIT (OUTPATIENT)
Dept: UROLOGY | Facility: OTHER | Age: 74
End: 2019-09-11
Payer: COMMERCIAL

## 2019-09-11 VITALS
OXYGEN SATURATION: 96 % | SYSTOLIC BLOOD PRESSURE: 163 MMHG | TEMPERATURE: 97.9 F | DIASTOLIC BLOOD PRESSURE: 77 MMHG | HEART RATE: 86 BPM

## 2019-09-11 DIAGNOSIS — R03.0 ELEVATED BP WITHOUT DIAGNOSIS OF HYPERTENSION: ICD-10-CM

## 2019-09-11 DIAGNOSIS — N40.1 BENIGN PROSTATIC HYPERPLASIA WITH LOWER URINARY TRACT SYMPTOMS, SYMPTOM DETAILS UNSPECIFIED: Primary | ICD-10-CM

## 2019-09-11 PROCEDURE — 99213 OFFICE O/P EST LOW 20 MIN: CPT | Mod: 25 | Performed by: UROLOGY

## 2019-09-11 PROCEDURE — 51798 US URINE CAPACITY MEASURE: CPT | Performed by: UROLOGY

## 2019-09-11 RX ORDER — TAMSULOSIN HYDROCHLORIDE 0.4 MG/1
0.4 CAPSULE ORAL DAILY
Qty: 90 CAPSULE | Refills: 3 | Status: SHIPPED | OUTPATIENT
Start: 2019-09-11 | End: 2020-12-08

## 2019-09-11 NOTE — PROGRESS NOTES
Chief Complaint   Patient presents with     RECHECK     Medication       Moisés Sinclair is a 73 year old male who presents today for follow up of   Chief Complaint   Patient presents with     RECHECK     Medication   Patient 73-year-old gentleman status post UroLift in the past with worsening urinary symptoms.  He was restarted back on Flomax again and has noticed improvements.  He stopped taking his medication for 4 to 5 days ago and has noticed return of his urinary symptoms.    Current Outpatient Medications   Medication Sig Dispense Refill     acetaminophen (TYLENOL) 500 MG tablet Take 1,000 mg by mouth every 6 hours as needed.       ARIPiprazole (ABILIFY) 5 MG tablet Take 2.5 mg by mouth daily       DULoxetine (CYMBALTA) 30 MG capsule Take 30 mg by mouth 2 times daily.       hydrOXYzine (ATARAX) 25 MG tablet Take 25-50 mg by mouth       ranitidine (ZANTAC) 150 MG tablet Take 150 mg by mouth       simvastatin (ZOCOR) 5 MG tablet Take 2.5 mg by mouth       tamsulosin (FLOMAX) 0.4 MG capsule Take 1 capsule (0.4 mg) by mouth daily Patient needs appointment for further refills. 90 capsule 3     cholecalciferol ( ULTRA STRENGTH) 2000 units CAPS Take 2,000 Units by mouth       phenazopyridine (AZO) 97.5 MG tablet Take 2 tablets (195 mg) by mouth 3 times daily (Patient not taking: Reported on 6/3/2019) 12 tablet 1     No Known Allergies   Past Medical History:   Diagnosis Date     Anxiety      Bipolar disorder (H)      Chronic pain      Closed fracture of navicular (scaphoid) bone of wrist     Wrist fracture left 3 pins     Enthesopathy of hip region 01/02/2008    bilat     Esophageal reflux      Mixed hyperlipidemia      Smoker      Unspecified essential hypertension      Unspecified hereditary and idiopathic peripheral neuropathy [id number]     Past Surgical History:   Procedure Laterality Date     CYSTOSCOPY N/A 3/8/2018    Procedure: urolift and cystoscopy;  Surgeon: Shahzad Villalba MD;  Location:  OR      FRACTURE TX, WRIST RT/LT  2000    Left wrist     ROTATOR CUFF REPAIR RT/LT      right shoulder     VASECTOMY       Family History   Problem Relation Age of Onset     Cerebrovascular Disease Father      Heart Disease Father         TIA triple by pass     Cancer Brother      Psychotic Disorder Brother      Heart Disease Sister         heart disease     Psychotic Disorder Sister         bi polar     Social History     Socioeconomic History     Marital status:      Spouse name: None     Number of children: None     Years of education: None     Highest education level: None   Occupational History     None   Social Needs     Financial resource strain: None     Food insecurity:     Worry: None     Inability: None     Transportation needs:     Medical: None     Non-medical: None   Tobacco Use     Smoking status: Former Smoker     Packs/day: 1.00     Types: Cigarettes     Smokeless tobacco: Never Used   Substance and Sexual Activity     Alcohol use: Yes     Comment: rare     Drug use: No     Sexual activity: Not Currently   Lifestyle     Physical activity:     Days per week: None     Minutes per session: None     Stress: None   Relationships     Social connections:     Talks on phone: None     Gets together: None     Attends Congregational service: None     Active member of club or organization: None     Attends meetings of clubs or organizations: None     Relationship status: None     Intimate partner violence:     Fear of current or ex partner: None     Emotionally abused: None     Physically abused: None     Forced sexual activity: None   Other Topics Concern     Parent/sibling w/ CABG, MI or angioplasty before 65F 55M? Not Asked   Social History Narrative     None       REVIEW OF SYSTEMS  =================  C: NEGATIVE for fever, chills, change in weight  I: NEGATIVE for worrisome rashes, moles or lesions  E/M: NEGATIVE for ear, mouth and throat problems  R: NEGATIVE for significant cough or SHORTNESS OF BREATH,    CV: NEGATIVE for chest pain, palpitations or peripheral edema  GI: NEGATIVE for nausea, abdominal pain, heartburn, or change in bowel habits  NEURO: NEGATIVE any motor/sensory changes  PSYCH: NEGATIVE for recent mood disorder    Physical Exam:  BP (!) 163/77 (BP Location: Right arm, Patient Position: Chair, Cuff Size: Adult Regular)   Pulse 86   Temp 97.9  F (36.6  C)   SpO2 96%    Patient is pleasant, in no acute distress, good general condition.  Lung: no evidence of respiratory distress    Abdomen: Soft, nondistended, non tender. No masses. No rebound or guarding.   Exam: Bladder scan with residual urine of 12 mL.  Skin: Warm and dry.  No redness.  Psych: normal mood and affect  Neuro: alert and oriented  Musculaskeletal: moving all extremities    Assessment/Plan:   (N40.1) Benign prostatic hyperplasia with lower urinary tract symptoms, symptom details unspecified  (primary encounter diagnosis)  Comment: Doing better on Flomax.  Plan: tamsulosin (FLOMAX) 0.4 MG capsule        Continue with Flomax.  See me in 1 year.    (R03.0) Elevated BP without diagnosis of hypertension  Comment:    Plan: Follow-up with primary care.

## 2019-09-11 NOTE — PROGRESS NOTES
Bladder scan performed. Maximum post void residual after 3 scans was 12 ml. MD was informed.    Toshia Ta CMA,

## 2020-12-08 DIAGNOSIS — N40.1 BENIGN PROSTATIC HYPERPLASIA WITH LOWER URINARY TRACT SYMPTOMS, SYMPTOM DETAILS UNSPECIFIED: ICD-10-CM

## 2020-12-08 RX ORDER — TAMSULOSIN HYDROCHLORIDE 0.4 MG/1
0.4 CAPSULE ORAL DAILY
Qty: 90 CAPSULE | Refills: 0 | Status: SHIPPED | OUTPATIENT
Start: 2020-12-08

## 2020-12-08 NOTE — TELEPHONE ENCOUNTER
Signed Prescriptions:                        Disp   Refills    tamsulosin (FLOMAX) 0.4 MG capsule         90 cap*0        Sig: Take 1 capsule (0.4 mg) by mouth daily Patient needs           appointment for further refills.  Authorizing Provider: ANDRY JIMENEZ  Ordering User: RADHA BRICE RN

## 2025-04-02 ENCOUNTER — TRANSCRIBE ORDERS (OUTPATIENT)
Dept: OTHER | Age: 80
End: 2025-04-02

## 2025-04-02 DIAGNOSIS — G47.33 OSA (OBSTRUCTIVE SLEEP APNEA): Primary | ICD-10-CM

## (undated) DEVICE — DRSG TELFA 2X3"

## (undated) DEVICE — DRAPE GYN/UROLOGY FLUID POUCH TUR 29455

## (undated) DEVICE — SOL WATER INJ 2000ML BAG 07118-07

## (undated) DEVICE — TUBING CYSTO/BLADDER IRRIG SET 80" 06544-01

## (undated) DEVICE — LUBRICATING JELLY 4.25OZ

## (undated) DEVICE — PREP SKIN SCRUB TRAY 4461A

## (undated) DEVICE — LABEL MEDICATION SYSTEM  3304

## (undated) DEVICE — PREP POVIDONE IODINE SCRUB 7.5% 120ML

## (undated) DEVICE — TUBING SUCTION 12"X1/4" N612

## (undated) DEVICE — PACK BASIC SET-UP 9101

## (undated) DEVICE — GLOVE PROTEXIS W/NEU-THERA 6.5  2D73TE65

## (undated) DEVICE — BASIN SET MINOR DISP

## (undated) DEVICE — PEN MARKING SKIN

## (undated) RX ORDER — LIDOCAINE HYDROCHLORIDE 20 MG/ML
INJECTION, SOLUTION EPIDURAL; INFILTRATION; INTRACAUDAL; PERINEURAL
Status: DISPENSED
Start: 2018-03-08

## (undated) RX ORDER — FENTANYL CITRATE 50 UG/ML
INJECTION, SOLUTION INTRAMUSCULAR; INTRAVENOUS
Status: DISPENSED
Start: 2018-03-08

## (undated) RX ORDER — PROPOFOL 10 MG/ML
INJECTION, EMULSION INTRAVENOUS
Status: DISPENSED
Start: 2018-03-08

## (undated) RX ORDER — DEXAMETHASONE SODIUM PHOSPHATE 10 MG/ML
INJECTION INTRAMUSCULAR; INTRAVENOUS
Status: DISPENSED
Start: 2018-03-08

## (undated) RX ORDER — ONDANSETRON 2 MG/ML
INJECTION INTRAMUSCULAR; INTRAVENOUS
Status: DISPENSED
Start: 2018-03-08

## (undated) RX ORDER — FLUMAZENIL 0.1 MG/ML
INJECTION, SOLUTION INTRAVENOUS
Status: DISPENSED
Start: 2018-03-08